# Patient Record
Sex: FEMALE | Race: BLACK OR AFRICAN AMERICAN | NOT HISPANIC OR LATINO | URBAN - METROPOLITAN AREA
[De-identification: names, ages, dates, MRNs, and addresses within clinical notes are randomized per-mention and may not be internally consistent; named-entity substitution may affect disease eponyms.]

---

## 2017-01-17 ENCOUNTER — OUTPATIENT (OUTPATIENT)
Dept: OUTPATIENT SERVICES | Facility: HOSPITAL | Age: 82
LOS: 1 days | Discharge: HOME | End: 2017-01-17

## 2017-01-17 ENCOUNTER — RESULT REVIEW (OUTPATIENT)
Age: 82
End: 2017-01-17

## 2017-01-17 DIAGNOSIS — C50.919 MALIGNANT NEOPLASM OF UNSPECIFIED SITE OF UNSPECIFIED FEMALE BREAST: ICD-10-CM

## 2017-01-18 ENCOUNTER — APPOINTMENT (OUTPATIENT)
Dept: HEMATOLOGY ONCOLOGY | Facility: CLINIC | Age: 82
End: 2017-01-18

## 2017-01-18 VITALS
RESPIRATION RATE: 16 BRPM | TEMPERATURE: 97.7 F | HEART RATE: 80 BPM | WEIGHT: 165.35 LBS | DIASTOLIC BLOOD PRESSURE: 80 MMHG | BODY MASS INDEX: 29.29 KG/M2 | OXYGEN SATURATION: 98 % | SYSTOLIC BLOOD PRESSURE: 146 MMHG

## 2017-01-18 LAB
HCT VFR BLD CALC: 41.7 % — SIGNIFICANT CHANGE UP (ref 34.5–45)
HGB BLD-MCNC: 13 G/DL — SIGNIFICANT CHANGE UP (ref 11.5–15.5)
MCHC RBC-ENTMCNC: 27.5 PG — SIGNIFICANT CHANGE UP (ref 27–34)
MCHC RBC-ENTMCNC: 31.1 GM/DL — LOW (ref 32–36)
MCV RBC AUTO: 88.6 FL — SIGNIFICANT CHANGE UP (ref 80–100)
PLATELET # BLD AUTO: 267 K/UL — SIGNIFICANT CHANGE UP (ref 150–400)
RBC # BLD: 4.71 M/UL — SIGNIFICANT CHANGE UP (ref 3.8–5.2)
RBC # FLD: 13.4 % — SIGNIFICANT CHANGE UP (ref 10.3–14.5)
WBC # BLD: 7.2 K/UL — SIGNIFICANT CHANGE UP (ref 3.8–10.5)
WBC # FLD AUTO: 7.2 K/UL — SIGNIFICANT CHANGE UP (ref 3.8–10.5)

## 2017-01-20 LAB
25(OH)D3 SERPL-MCNC: 42.3 NG/ML
ALBUMIN SERPL ELPH-MCNC: 4.2 G/DL
ALP BLD-CCNC: 77 U/L
ALT SERPL-CCNC: 12 U/L
ANION GAP SERPL CALC-SCNC: 16 MMOL/L
AST SERPL-CCNC: 19 U/L
BILIRUB SERPL-MCNC: 0.3 MG/DL
BUN SERPL-MCNC: 13 MG/DL
CALCIUM SERPL-MCNC: 10.2 MG/DL
CHLORIDE SERPL-SCNC: 103 MMOL/L
CO2 SERPL-SCNC: 26 MMOL/L
CREAT SERPL-MCNC: 1.28 MG/DL
GLUCOSE SERPL-MCNC: 143 MG/DL
POTASSIUM SERPL-SCNC: 4.3 MMOL/L
PROT SERPL-MCNC: 7.5 G/DL
SODIUM SERPL-SCNC: 145 MMOL/L

## 2017-03-14 ENCOUNTER — APPOINTMENT (OUTPATIENT)
Dept: CARDIOLOGY | Facility: CLINIC | Age: 82
End: 2017-03-14

## 2017-03-16 ENCOUNTER — RX RENEWAL (OUTPATIENT)
Age: 82
End: 2017-03-16

## 2017-05-04 ENCOUNTER — OUTPATIENT (OUTPATIENT)
Dept: OUTPATIENT SERVICES | Facility: HOSPITAL | Age: 82
LOS: 1 days | Discharge: HOME | End: 2017-05-04

## 2017-05-04 DIAGNOSIS — C50.919 MALIGNANT NEOPLASM OF UNSPECIFIED SITE OF UNSPECIFIED FEMALE BREAST: ICD-10-CM

## 2017-05-08 ENCOUNTER — APPOINTMENT (OUTPATIENT)
Dept: HEMATOLOGY ONCOLOGY | Facility: CLINIC | Age: 82
End: 2017-05-08

## 2017-05-08 ENCOUNTER — RESULT REVIEW (OUTPATIENT)
Age: 82
End: 2017-05-08

## 2017-05-08 VITALS
DIASTOLIC BLOOD PRESSURE: 70 MMHG | SYSTOLIC BLOOD PRESSURE: 148 MMHG | TEMPERATURE: 97.8 F | BODY MASS INDEX: 29.09 KG/M2 | RESPIRATION RATE: 17 BRPM | HEART RATE: 65 BPM | WEIGHT: 164.24 LBS | OXYGEN SATURATION: 97 %

## 2017-05-08 DIAGNOSIS — I89.0 LYMPHEDEMA, NOT ELSEWHERE CLASSIFIED: ICD-10-CM

## 2017-05-08 LAB
HCT VFR BLD CALC: 36.9 % — SIGNIFICANT CHANGE UP (ref 34.5–45)
HGB BLD-MCNC: 11.6 G/DL — SIGNIFICANT CHANGE UP (ref 11.5–15.5)
MCHC RBC-ENTMCNC: 28.1 PG — SIGNIFICANT CHANGE UP (ref 27–34)
MCHC RBC-ENTMCNC: 31.5 G/DL — LOW (ref 32–36)
MCV RBC AUTO: 89.3 FL — SIGNIFICANT CHANGE UP (ref 80–100)
PLATELET # BLD AUTO: 267 K/UL — SIGNIFICANT CHANGE UP (ref 150–400)
RBC # BLD: 4.13 M/UL — SIGNIFICANT CHANGE UP (ref 3.8–5.2)
RBC # FLD: 13.1 % — SIGNIFICANT CHANGE UP (ref 10.3–14.5)
WBC # BLD: 8 K/UL — SIGNIFICANT CHANGE UP (ref 3.8–10.5)
WBC # FLD AUTO: 8 K/UL — SIGNIFICANT CHANGE UP (ref 3.8–10.5)

## 2017-05-08 RX ORDER — CELECOXIB 100 MG/1
100 CAPSULE ORAL
Qty: 60 | Refills: 1 | Status: ACTIVE | COMMUNITY
Start: 2017-05-08 | End: 1900-01-01

## 2017-05-09 LAB
ALBUMIN SERPL ELPH-MCNC: 4.4 G/DL
ALP BLD-CCNC: 70 U/L
ALT SERPL-CCNC: 13 U/L
ANION GAP SERPL CALC-SCNC: 15 MMOL/L
AST SERPL-CCNC: 21 U/L
BILIRUB SERPL-MCNC: 0.2 MG/DL
BUN SERPL-MCNC: 23 MG/DL
CALCIUM SERPL-MCNC: 10.1 MG/DL
CANCER AG27-29 SERPL-ACNC: 12.6 U/ML
CEA SERPL-MCNC: 2.8 NG/ML
CHLORIDE SERPL-SCNC: 102 MMOL/L
CO2 SERPL-SCNC: 24 MMOL/L
CREAT SERPL-MCNC: 1.24 MG/DL
GLUCOSE SERPL-MCNC: 102 MG/DL
POTASSIUM SERPL-SCNC: 4.5 MMOL/L
PROT SERPL-MCNC: 7.3 G/DL
SODIUM SERPL-SCNC: 141 MMOL/L

## 2017-05-23 ENCOUNTER — APPOINTMENT (OUTPATIENT)
Dept: CARDIOLOGY | Facility: CLINIC | Age: 82
End: 2017-05-23

## 2017-05-23 VITALS
WEIGHT: 160 LBS | SYSTOLIC BLOOD PRESSURE: 140 MMHG | DIASTOLIC BLOOD PRESSURE: 70 MMHG | HEART RATE: 80 BPM | HEIGHT: 63 IN | BODY MASS INDEX: 28.35 KG/M2

## 2017-05-23 DIAGNOSIS — Z92.3 PERSONAL HISTORY OF IRRADIATION: ICD-10-CM

## 2017-05-23 DIAGNOSIS — Z92.21 PERSONAL HISTORY OF ANTINEOPLASTIC CHEMOTHERAPY: ICD-10-CM

## 2017-05-23 DIAGNOSIS — E11.9 TYPE 2 DIABETES MELLITUS W/OUT COMPLICATIONS: ICD-10-CM

## 2017-05-23 DIAGNOSIS — I10 ESSENTIAL (PRIMARY) HYPERTENSION: ICD-10-CM

## 2017-05-23 DIAGNOSIS — E78.5 HYPERLIPIDEMIA, UNSPECIFIED: ICD-10-CM

## 2017-06-01 ENCOUNTER — FORM ENCOUNTER (OUTPATIENT)
Age: 82
End: 2017-06-01

## 2017-06-02 ENCOUNTER — APPOINTMENT (OUTPATIENT)
Dept: ULTRASOUND IMAGING | Facility: IMAGING CENTER | Age: 82
End: 2017-06-02

## 2017-06-02 ENCOUNTER — OUTPATIENT (OUTPATIENT)
Dept: OUTPATIENT SERVICES | Facility: HOSPITAL | Age: 82
LOS: 1 days | End: 2017-06-02
Payer: MEDICARE

## 2017-06-02 DIAGNOSIS — Z00.8 ENCOUNTER FOR OTHER GENERAL EXAMINATION: ICD-10-CM

## 2017-06-02 PROCEDURE — G0279: CPT

## 2017-06-02 PROCEDURE — 76642 ULTRASOUND BREAST LIMITED: CPT

## 2017-06-02 PROCEDURE — 77065 DX MAMMO INCL CAD UNI: CPT

## 2017-06-08 ENCOUNTER — FORM ENCOUNTER (OUTPATIENT)
Age: 82
End: 2017-06-08

## 2017-06-09 ENCOUNTER — RESULT REVIEW (OUTPATIENT)
Age: 82
End: 2017-06-09

## 2017-06-09 ENCOUNTER — OUTPATIENT (OUTPATIENT)
Dept: OUTPATIENT SERVICES | Facility: HOSPITAL | Age: 82
LOS: 1 days | End: 2017-06-09
Payer: MEDICARE

## 2017-06-09 ENCOUNTER — APPOINTMENT (OUTPATIENT)
Dept: ULTRASOUND IMAGING | Facility: IMAGING CENTER | Age: 82
End: 2017-06-09

## 2017-06-09 DIAGNOSIS — Z00.8 ENCOUNTER FOR OTHER GENERAL EXAMINATION: ICD-10-CM

## 2017-06-09 PROCEDURE — 88305 TISSUE EXAM BY PATHOLOGIST: CPT

## 2017-06-09 PROCEDURE — 88377 M/PHMTRC ALYS ISHQUANT/SEMIQ: CPT

## 2017-06-09 PROCEDURE — 88360 TUMOR IMMUNOHISTOCHEM/MANUAL: CPT

## 2017-06-09 PROCEDURE — A4648: CPT

## 2017-06-09 PROCEDURE — 19083 BX BREAST 1ST LESION US IMAG: CPT

## 2017-06-12 ENCOUNTER — FORM ENCOUNTER (OUTPATIENT)
Age: 82
End: 2017-06-12

## 2017-06-13 ENCOUNTER — APPOINTMENT (OUTPATIENT)
Dept: CT IMAGING | Facility: IMAGING CENTER | Age: 82
End: 2017-06-13

## 2017-06-13 ENCOUNTER — APPOINTMENT (OUTPATIENT)
Dept: NUCLEAR MEDICINE | Facility: IMAGING CENTER | Age: 82
End: 2017-06-13

## 2017-06-13 ENCOUNTER — OUTPATIENT (OUTPATIENT)
Dept: OUTPATIENT SERVICES | Facility: HOSPITAL | Age: 82
LOS: 1 days | End: 2017-06-13
Payer: MEDICARE

## 2017-06-13 DIAGNOSIS — C50.919 MALIGNANT NEOPLASM OF UNSPECIFIED SITE OF UNSPECIFIED FEMALE BREAST: ICD-10-CM

## 2017-06-13 PROCEDURE — 74177 CT ABD & PELVIS W/CONTRAST: CPT | Mod: 26

## 2017-06-13 PROCEDURE — 78306 BONE IMAGING WHOLE BODY: CPT | Mod: 26

## 2017-06-13 PROCEDURE — 71260 CT THORAX DX C+: CPT

## 2017-06-13 PROCEDURE — 71260 CT THORAX DX C+: CPT | Mod: 26

## 2017-06-13 PROCEDURE — A9561: CPT

## 2017-06-13 PROCEDURE — 74177 CT ABD & PELVIS W/CONTRAST: CPT

## 2017-06-13 PROCEDURE — 78306 BONE IMAGING WHOLE BODY: CPT

## 2017-06-13 PROCEDURE — 78320: CPT | Mod: 26

## 2017-06-13 PROCEDURE — 78999 UNLISTED MISC PX DX NUC MED: CPT

## 2017-06-22 ENCOUNTER — FORM ENCOUNTER (OUTPATIENT)
Age: 82
End: 2017-06-22

## 2017-06-23 ENCOUNTER — APPOINTMENT (OUTPATIENT)
Dept: MRI IMAGING | Facility: IMAGING CENTER | Age: 82
End: 2017-06-23

## 2017-06-23 ENCOUNTER — OUTPATIENT (OUTPATIENT)
Dept: OUTPATIENT SERVICES | Facility: HOSPITAL | Age: 82
LOS: 1 days | End: 2017-06-23
Payer: MEDICARE

## 2017-06-23 DIAGNOSIS — C50.919 MALIGNANT NEOPLASM OF UNSPECIFIED SITE OF UNSPECIFIED FEMALE BREAST: ICD-10-CM

## 2017-06-23 PROCEDURE — 74183 MRI ABD W/O CNTR FLWD CNTR: CPT

## 2017-06-23 PROCEDURE — 82565 ASSAY OF CREATININE: CPT

## 2017-06-23 PROCEDURE — A9585: CPT

## 2017-06-29 ENCOUNTER — OUTPATIENT (OUTPATIENT)
Dept: OUTPATIENT SERVICES | Facility: HOSPITAL | Age: 82
LOS: 1 days | Discharge: HOME | End: 2017-06-29

## 2017-06-29 DIAGNOSIS — C50.919 MALIGNANT NEOPLASM OF UNSPECIFIED SITE OF UNSPECIFIED FEMALE BREAST: ICD-10-CM

## 2017-07-06 ENCOUNTER — APPOINTMENT (OUTPATIENT)
Dept: HEMATOLOGY ONCOLOGY | Facility: CLINIC | Age: 82
End: 2017-07-06

## 2017-07-06 VITALS
DIASTOLIC BLOOD PRESSURE: 69 MMHG | OXYGEN SATURATION: 98 % | HEART RATE: 67 BPM | SYSTOLIC BLOOD PRESSURE: 168 MMHG | RESPIRATION RATE: 18 BRPM | WEIGHT: 163.36 LBS | TEMPERATURE: 98.4 F | BODY MASS INDEX: 28.94 KG/M2

## 2017-07-12 ENCOUNTER — FORM ENCOUNTER (OUTPATIENT)
Age: 82
End: 2017-07-12

## 2017-07-18 ENCOUNTER — APPOINTMENT (OUTPATIENT)
Dept: CV DIAGNOSITCS | Facility: HOSPITAL | Age: 82
End: 2017-07-18

## 2017-07-18 ENCOUNTER — OUTPATIENT (OUTPATIENT)
Dept: OUTPATIENT SERVICES | Facility: HOSPITAL | Age: 82
LOS: 1 days | End: 2017-07-18
Payer: MEDICARE

## 2017-07-18 DIAGNOSIS — C50.919 MALIGNANT NEOPLASM OF UNSPECIFIED SITE OF UNSPECIFIED FEMALE BREAST: ICD-10-CM

## 2017-07-18 PROCEDURE — 0399T: CPT

## 2017-07-18 PROCEDURE — 93356 MYOCRD STRAIN IMG SPCKL TRCK: CPT

## 2017-07-18 PROCEDURE — 93306 TTE W/DOPPLER COMPLETE: CPT | Mod: 26

## 2017-07-18 PROCEDURE — 93306 TTE W/DOPPLER COMPLETE: CPT

## 2017-08-17 ENCOUNTER — OUTPATIENT (OUTPATIENT)
Dept: OUTPATIENT SERVICES | Facility: HOSPITAL | Age: 82
LOS: 1 days | Discharge: HOME | End: 2017-08-17

## 2017-08-17 DIAGNOSIS — C50.919 MALIGNANT NEOPLASM OF UNSPECIFIED SITE OF UNSPECIFIED FEMALE BREAST: ICD-10-CM

## 2017-08-18 ENCOUNTER — APPOINTMENT (OUTPATIENT)
Dept: HEMATOLOGY ONCOLOGY | Facility: CLINIC | Age: 82
End: 2017-08-18
Payer: MEDICARE

## 2017-08-18 ENCOUNTER — RESULT REVIEW (OUTPATIENT)
Age: 82
End: 2017-08-18

## 2017-08-18 VITALS
RESPIRATION RATE: 18 BRPM | WEIGHT: 167.55 LBS | BODY MASS INDEX: 29.68 KG/M2 | SYSTOLIC BLOOD PRESSURE: 185 MMHG | DIASTOLIC BLOOD PRESSURE: 79 MMHG | OXYGEN SATURATION: 99 % | TEMPERATURE: 98.3 F | HEART RATE: 57 BPM

## 2017-08-18 LAB
HCT VFR BLD CALC: 38.7 % — SIGNIFICANT CHANGE UP (ref 34.5–45)
HGB BLD-MCNC: 12.4 G/DL — SIGNIFICANT CHANGE UP (ref 11.5–15.5)
MCHC RBC-ENTMCNC: 28.4 PG — SIGNIFICANT CHANGE UP (ref 27–34)
MCHC RBC-ENTMCNC: 32 G/DL — SIGNIFICANT CHANGE UP (ref 32–36)
MCV RBC AUTO: 88.6 FL — SIGNIFICANT CHANGE UP (ref 80–100)
PLATELET # BLD AUTO: 254 K/UL — SIGNIFICANT CHANGE UP (ref 150–400)
RBC # BLD: 4.37 M/UL — SIGNIFICANT CHANGE UP (ref 3.8–5.2)
RBC # FLD: 13.7 % — SIGNIFICANT CHANGE UP (ref 10.3–14.5)
WBC # BLD: 5.6 K/UL — SIGNIFICANT CHANGE UP (ref 3.8–10.5)
WBC # FLD AUTO: 5.6 K/UL — SIGNIFICANT CHANGE UP (ref 3.8–10.5)

## 2017-08-18 PROCEDURE — 99214 OFFICE O/P EST MOD 30 MIN: CPT

## 2017-08-20 LAB
ALBUMIN SERPL ELPH-MCNC: 4.1 G/DL
ALP BLD-CCNC: 76 U/L
ALT SERPL-CCNC: 13 U/L
ANION GAP SERPL CALC-SCNC: 17 MMOL/L
APTT BLD: 32.8 SEC
AST SERPL-CCNC: 23 U/L
BILIRUB SERPL-MCNC: 0.2 MG/DL
BUN SERPL-MCNC: 13 MG/DL
CALCIUM SERPL-MCNC: 10.2 MG/DL
CANCER AG27-29 SERPL-ACNC: 15.9 U/ML
CEA SERPL-MCNC: 7.8 NG/ML
CHLORIDE SERPL-SCNC: 100 MMOL/L
CHOLEST SERPL-MCNC: 221 MG/DL
CHOLEST/HDLC SERPL: 2.5 RATIO
CO2 SERPL-SCNC: 25 MMOL/L
CREAT SERPL-MCNC: 1.14 MG/DL
GLUCOSE SERPL-MCNC: 109 MG/DL
HBA1C MFR BLD HPLC: 6.7 %
HDLC SERPL-MCNC: 90 MG/DL
INR PPP: 1 RATIO
LDLC SERPL CALC-MCNC: 119 MG/DL
MAGNESIUM SERPL-MCNC: 1.8 MG/DL
POTASSIUM SERPL-SCNC: 4.4 MMOL/L
PROT SERPL-MCNC: 7.1 G/DL
PT BLD: 11.3 SEC
SODIUM SERPL-SCNC: 142 MMOL/L
TRIGL SERPL-MCNC: 62 MG/DL

## 2017-08-30 ENCOUNTER — OUTPATIENT (OUTPATIENT)
Dept: OUTPATIENT SERVICES | Facility: HOSPITAL | Age: 82
LOS: 1 days | End: 2017-08-30
Payer: MEDICARE

## 2017-08-30 VITALS
HEIGHT: 64 IN | WEIGHT: 169.98 LBS | DIASTOLIC BLOOD PRESSURE: 80 MMHG | OXYGEN SATURATION: 97 % | HEART RATE: 84 BPM | SYSTOLIC BLOOD PRESSURE: 148 MMHG | TEMPERATURE: 99 F | RESPIRATION RATE: 16 BRPM

## 2017-08-30 DIAGNOSIS — Z90.12 ACQUIRED ABSENCE OF LEFT BREAST AND NIPPLE: Chronic | ICD-10-CM

## 2017-08-30 DIAGNOSIS — R22.2 LOCALIZED SWELLING, MASS AND LUMP, TRUNK: ICD-10-CM

## 2017-08-30 DIAGNOSIS — C50.919 MALIGNANT NEOPLASM OF UNSPECIFIED SITE OF UNSPECIFIED FEMALE BREAST: ICD-10-CM

## 2017-08-30 DIAGNOSIS — I10 ESSENTIAL (PRIMARY) HYPERTENSION: ICD-10-CM

## 2017-08-30 DIAGNOSIS — Z01.818 ENCOUNTER FOR OTHER PREPROCEDURAL EXAMINATION: ICD-10-CM

## 2017-08-30 PROCEDURE — G0463: CPT

## 2017-08-30 RX ORDER — SODIUM CHLORIDE 9 MG/ML
3 INJECTION INTRAMUSCULAR; INTRAVENOUS; SUBCUTANEOUS EVERY 8 HOURS
Qty: 0 | Refills: 0 | Status: DISCONTINUED | OUTPATIENT
Start: 2017-09-06 | End: 2017-09-21

## 2017-08-30 RX ORDER — ACETAMINOPHEN 500 MG
975 TABLET ORAL ONCE
Qty: 0 | Refills: 0 | Status: COMPLETED | OUTPATIENT
Start: 2017-09-06 | End: 2017-09-06

## 2017-08-30 RX ORDER — CELECOXIB 200 MG/1
200 CAPSULE ORAL ONCE
Qty: 0 | Refills: 0 | Status: COMPLETED | OUTPATIENT
Start: 2017-09-06 | End: 2017-09-06

## 2017-08-30 RX ORDER — LIDOCAINE HCL 20 MG/ML
0.2 VIAL (ML) INJECTION ONCE
Qty: 0 | Refills: 0 | Status: DISCONTINUED | OUTPATIENT
Start: 2017-09-06 | End: 2017-09-21

## 2017-08-30 NOTE — H&P PST ADULT - PRIMARY CARE PROVIDER
Dr.Michelle Luna # [phone]                  Dr. Chery  ([facility])# [phone]

## 2017-08-30 NOTE — H&P PST ADULT - HISTORY OF PRESENT ILLNESS
82 year old female diagnosed with breast cancer (2015), had chemotherapy s/p left modified radical mastectomy. Pt had f/u evaluation revealed left chest mass- s/p MRI chest - scheduled for excision of left chest mass on 09/06/2017

## 2017-08-30 NOTE — H&P PST ADULT - PMH
Arthritis of spine    Breast cancer, left    Chest mass    Diabetes  diabetes mellitus type II  fingerstick range in the mornig 110-120 mg/ dl  H/O lymphadenopathy  left arm  H/O neuropathy  Since chemotherapy  HTN (hypertension)    Hyperlipidemia

## 2017-09-06 ENCOUNTER — TRANSCRIPTION ENCOUNTER (OUTPATIENT)
Age: 82
End: 2017-09-06

## 2017-09-06 ENCOUNTER — RESULT REVIEW (OUTPATIENT)
Age: 82
End: 2017-09-06

## 2017-09-06 ENCOUNTER — OUTPATIENT (OUTPATIENT)
Dept: OUTPATIENT SERVICES | Facility: HOSPITAL | Age: 82
LOS: 1 days | End: 2017-09-06
Payer: MEDICARE

## 2017-09-06 VITALS
HEIGHT: 64 IN | TEMPERATURE: 98 F | RESPIRATION RATE: 20 BRPM | WEIGHT: 169.98 LBS | SYSTOLIC BLOOD PRESSURE: 167 MMHG | OXYGEN SATURATION: 99 % | DIASTOLIC BLOOD PRESSURE: 78 MMHG | HEART RATE: 76 BPM

## 2017-09-06 VITALS
DIASTOLIC BLOOD PRESSURE: 80 MMHG | HEART RATE: 76 BPM | OXYGEN SATURATION: 99 % | TEMPERATURE: 99 F | RESPIRATION RATE: 16 BRPM | SYSTOLIC BLOOD PRESSURE: 140 MMHG

## 2017-09-06 DIAGNOSIS — C50.919 MALIGNANT NEOPLASM OF UNSPECIFIED SITE OF UNSPECIFIED FEMALE BREAST: ICD-10-CM

## 2017-09-06 DIAGNOSIS — Z90.12 ACQUIRED ABSENCE OF LEFT BREAST AND NIPPLE: Chronic | ICD-10-CM

## 2017-09-06 PROCEDURE — 21554 EXC NECK TUM DEEP 5 CM/>: CPT

## 2017-09-06 PROCEDURE — 88305 TISSUE EXAM BY PATHOLOGIST: CPT | Mod: 26

## 2017-09-06 PROCEDURE — 88377 M/PHMTRC ALYS ISHQUANT/SEMIQ: CPT | Mod: 26

## 2017-09-06 PROCEDURE — 88360 TUMOR IMMUNOHISTOCHEM/MANUAL: CPT | Mod: 26

## 2017-09-06 PROCEDURE — 88305 TISSUE EXAM BY PATHOLOGIST: CPT

## 2017-09-06 PROCEDURE — 88341 IMHCHEM/IMCYTCHM EA ADD ANTB: CPT

## 2017-09-06 PROCEDURE — 14301 TIS TRNFR ANY 30.1-60 SQ CM: CPT

## 2017-09-06 PROCEDURE — 14302 TIS TRNFR ADDL 30 SQ CM: CPT

## 2017-09-06 PROCEDURE — 88342 IMHCHEM/IMCYTCHM 1ST ANTB: CPT | Mod: 26,59

## 2017-09-06 PROCEDURE — 88377 M/PHMTRC ALYS ISHQUANT/SEMIQ: CPT

## 2017-09-06 PROCEDURE — 88360 TUMOR IMMUNOHISTOCHEM/MANUAL: CPT

## 2017-09-06 RX ORDER — HYDROMORPHONE HYDROCHLORIDE 2 MG/ML
0.25 INJECTION INTRAMUSCULAR; INTRAVENOUS; SUBCUTANEOUS
Qty: 0 | Refills: 0 | Status: DISCONTINUED | OUTPATIENT
Start: 2017-09-06 | End: 2017-09-06

## 2017-09-06 RX ORDER — ACETAMINOPHEN WITH CODEINE 300MG-30MG
1 TABLET ORAL
Qty: 20 | Refills: 0 | OUTPATIENT
Start: 2017-09-06

## 2017-09-06 RX ADMIN — HYDROMORPHONE HYDROCHLORIDE 0.25 MILLIGRAM(S): 2 INJECTION INTRAMUSCULAR; INTRAVENOUS; SUBCUTANEOUS at 15:10

## 2017-09-06 RX ADMIN — HYDROMORPHONE HYDROCHLORIDE 0.25 MILLIGRAM(S): 2 INJECTION INTRAMUSCULAR; INTRAVENOUS; SUBCUTANEOUS at 15:02

## 2017-09-06 RX ADMIN — Medication 975 MILLIGRAM(S): at 11:10

## 2017-09-06 RX ADMIN — CELECOXIB 200 MILLIGRAM(S): 200 CAPSULE ORAL at 11:10

## 2017-09-06 NOTE — ASU DISCHARGE PLAN (ADULT/PEDIATRIC). - MEDICATION SUMMARY - MEDICATIONS TO TAKE
I will START or STAY ON the medications listed below when I get home from the hospital:    Tylenol with Codeine #3 oral tablet  -- 1 tab(s) by mouth every 6 hours, As Needed for moderate to severe pain MDD:4 tabs  -- Caution federal law prohibits the transfer of this drug to any person other  than the person for whom it was prescribed.  May cause drowsiness.  Alcohol may intensify this effect.  Use care when operating dangerous machinery.  This product contains acetaminophen.  Do not use  with any other product containing acetaminophen to prevent possible liver damage.  Using more of this medication than prescribed may cause serious breathing problems.    -- Indication: For Postoperative pain control as needed    CeleBREX 100 mg oral capsule  --  by mouth 2 times a day  -- Indication: For Home medication    quinapril 40 mg oral tablet  -- 1 tab(s) by mouth once a day  -- Indication: For Home medication    gabapentin 300 mg oral capsule  -- 1 cap(s) by mouth 2 times a day  -- Indication: For Home medication    metFORMIN 1000 mg oral tablet  -- 1 tab(s) by mouth 2 times a day  -- Indication: For Home medication    labetalol 100 mg oral tablet  --  by mouth once a day  -- Indication: For Home medication    amLODIPine 10 mg oral tablet  -- 1 tab(s) by mouth once a day  -- Indication: For Home medication    famotidine 20 mg oral tablet  --  by mouth x 2 doses pre op x 2 doses as directed  -- Indication: For Home medication    Augmentin 875 mg-125 mg oral tablet  -- 1 tab(s) by mouth every 12 hours  -- Finish all this medication unless otherwise directed by prescriber.  Take with food or milk.    -- Indication: For Postoperative antibiotics    Vitamin D3  -- 100 milligram(s) by mouth once a day  -- Indication: For Home medication    Vitamin B12 100 mcg oral tablet  -- 1 tab(s) by mouth once a day  -- Indication: For Home medication

## 2017-09-06 NOTE — PRE-ANESTHESIA EVALUATION ADULT - NSANTHOSAYNRD_GEN_A_CORE
No. LUIS ALBERTO screening performed.  STOP BANG Legend: 0-2 = LOW Risk; 3-4 = INTERMEDIATE Risk; 5-8 = HIGH Risk

## 2017-09-06 NOTE — ASU DISCHARGE PLAN (ADULT/PEDIATRIC). - NOTIFY
Fever greater than 101/Swelling that continues/Bleeding that does not stop/Persistent Nausea and Vomiting/Unable to Urinate/Inability to Tolerate Liquids or Foods/Pain not relieved by Medications

## 2017-09-11 LAB — SURGICAL PATHOLOGY STUDY: SIGNIFICANT CHANGE UP

## 2017-10-24 ENCOUNTER — OUTPATIENT (OUTPATIENT)
Dept: OUTPATIENT SERVICES | Facility: HOSPITAL | Age: 82
LOS: 1 days | Discharge: HOME | End: 2017-10-24

## 2017-10-24 DIAGNOSIS — Z90.12 ACQUIRED ABSENCE OF LEFT BREAST AND NIPPLE: Chronic | ICD-10-CM

## 2017-10-24 DIAGNOSIS — C50.919 MALIGNANT NEOPLASM OF UNSPECIFIED SITE OF UNSPECIFIED FEMALE BREAST: ICD-10-CM

## 2017-10-30 ENCOUNTER — APPOINTMENT (OUTPATIENT)
Dept: HEMATOLOGY ONCOLOGY | Facility: CLINIC | Age: 82
End: 2017-10-30
Payer: MEDICARE

## 2017-10-30 ENCOUNTER — LABORATORY RESULT (OUTPATIENT)
Age: 82
End: 2017-10-30

## 2017-10-30 ENCOUNTER — RESULT REVIEW (OUTPATIENT)
Age: 82
End: 2017-10-30

## 2017-10-30 VITALS
OXYGEN SATURATION: 99 % | DIASTOLIC BLOOD PRESSURE: 72 MMHG | RESPIRATION RATE: 16 BRPM | HEART RATE: 71 BPM | BODY MASS INDEX: 29.48 KG/M2 | WEIGHT: 166.45 LBS | SYSTOLIC BLOOD PRESSURE: 153 MMHG | TEMPERATURE: 98.1 F

## 2017-10-30 LAB
ALBUMIN SERPL ELPH-MCNC: 3.9 G/DL
ALP BLD-CCNC: 78 U/L
ALT SERPL-CCNC: 15 U/L
ANION GAP SERPL CALC-SCNC: 13 MMOL/L
AST SERPL-CCNC: 34 U/L
BILIRUB SERPL-MCNC: 0.3 MG/DL
BUN SERPL-MCNC: 17 MG/DL
CALCIUM SERPL-MCNC: 10.2 MG/DL
CHLORIDE SERPL-SCNC: 102 MMOL/L
CO2 SERPL-SCNC: 26 MMOL/L
CREAT SERPL-MCNC: 1.3 MG/DL
GLUCOSE SERPL-MCNC: 112 MG/DL
HCT VFR BLD CALC: 37.3 % — SIGNIFICANT CHANGE UP (ref 34.5–45)
HGB BLD-MCNC: 12.5 G/DL — SIGNIFICANT CHANGE UP (ref 11.5–15.5)
MCHC RBC-ENTMCNC: 29.3 PG — SIGNIFICANT CHANGE UP (ref 27–34)
MCHC RBC-ENTMCNC: 33.4 G/DL — SIGNIFICANT CHANGE UP (ref 32–36)
MCV RBC AUTO: 87.7 FL — SIGNIFICANT CHANGE UP (ref 80–100)
PLATELET # BLD AUTO: 297 K/UL — SIGNIFICANT CHANGE UP (ref 150–400)
POTASSIUM SERPL-SCNC: 4.5 MMOL/L
PROT SERPL-MCNC: 7.1 G/DL
RBC # BLD: 4.26 M/UL — SIGNIFICANT CHANGE UP (ref 3.8–5.2)
RBC # FLD: 13.4 % — SIGNIFICANT CHANGE UP (ref 10.3–14.5)
SODIUM SERPL-SCNC: 141 MMOL/L
WBC # BLD: 5.2 K/UL — SIGNIFICANT CHANGE UP (ref 3.8–10.5)
WBC # FLD AUTO: 5.2 K/UL — SIGNIFICANT CHANGE UP (ref 3.8–10.5)

## 2017-10-30 PROCEDURE — 99215 OFFICE O/P EST HI 40 MIN: CPT

## 2017-10-30 RX ORDER — UBIDECARENONE/VIT E ACET 100MG-5
50 MCG CAPSULE ORAL
Refills: 0 | Status: ACTIVE | COMMUNITY
Start: 2017-10-30

## 2017-10-30 RX ORDER — MAGNESIUM 200 MG
200 TABLET ORAL
Refills: 0 | Status: ACTIVE | COMMUNITY
Start: 2017-10-30

## 2017-10-30 RX ORDER — ASCORBIC ACID 1000 MG
10 TABLET ORAL
Refills: 0 | Status: ACTIVE | COMMUNITY
Start: 2017-10-30

## 2017-11-06 ENCOUNTER — FORM ENCOUNTER (OUTPATIENT)
Age: 82
End: 2017-11-06

## 2017-11-07 ENCOUNTER — APPOINTMENT (OUTPATIENT)
Dept: CT IMAGING | Facility: IMAGING CENTER | Age: 82
End: 2017-11-07
Payer: MEDICARE

## 2017-11-07 ENCOUNTER — OUTPATIENT (OUTPATIENT)
Dept: OUTPATIENT SERVICES | Facility: HOSPITAL | Age: 82
LOS: 1 days | End: 2017-11-07
Payer: MEDICARE

## 2017-11-07 DIAGNOSIS — R22.32 LOCALIZED SWELLING, MASS AND LUMP, LEFT UPPER LIMB: ICD-10-CM

## 2017-11-07 DIAGNOSIS — Z90.12 ACQUIRED ABSENCE OF LEFT BREAST AND NIPPLE: Chronic | ICD-10-CM

## 2017-11-07 PROCEDURE — 71260 CT THORAX DX C+: CPT

## 2017-11-07 PROCEDURE — 71260 CT THORAX DX C+: CPT | Mod: 26

## 2017-11-08 RX ORDER — ENZALUTAMIDE 40 MG/1
40 CAPSULE ORAL DAILY
Qty: 120 | Refills: 2 | Status: DISCONTINUED | COMMUNITY
Start: 2017-11-07 | End: 2017-11-08

## 2017-11-08 RX ORDER — CAPECITABINE 500 MG/1
500 TABLET ORAL TWICE DAILY
Qty: 84 | Refills: 3 | Status: ACTIVE | COMMUNITY
Start: 2017-11-08 | End: 1900-01-01

## 2017-11-17 ENCOUNTER — OUTPATIENT (OUTPATIENT)
Dept: OUTPATIENT SERVICES | Facility: HOSPITAL | Age: 82
LOS: 1 days | Discharge: HOME | End: 2017-11-17

## 2017-11-17 DIAGNOSIS — C50.919 MALIGNANT NEOPLASM OF UNSPECIFIED SITE OF UNSPECIFIED FEMALE BREAST: ICD-10-CM

## 2017-11-17 DIAGNOSIS — Z90.12 ACQUIRED ABSENCE OF LEFT BREAST AND NIPPLE: Chronic | ICD-10-CM

## 2017-11-24 ENCOUNTER — FORM ENCOUNTER (OUTPATIENT)
Age: 82
End: 2017-11-24

## 2017-11-25 ENCOUNTER — APPOINTMENT (OUTPATIENT)
Dept: NUCLEAR MEDICINE | Facility: IMAGING CENTER | Age: 82
End: 2017-11-25
Payer: MEDICARE

## 2017-11-25 ENCOUNTER — OUTPATIENT (OUTPATIENT)
Dept: OUTPATIENT SERVICES | Facility: HOSPITAL | Age: 82
LOS: 1 days | End: 2017-11-25
Payer: MEDICARE

## 2017-11-25 DIAGNOSIS — C50.919 MALIGNANT NEOPLASM OF UNSPECIFIED SITE OF UNSPECIFIED FEMALE BREAST: ICD-10-CM

## 2017-11-25 DIAGNOSIS — Z90.12 ACQUIRED ABSENCE OF LEFT BREAST AND NIPPLE: Chronic | ICD-10-CM

## 2017-11-25 PROCEDURE — 78815 PET IMAGE W/CT SKULL-THIGH: CPT

## 2017-11-25 PROCEDURE — 78815 PET IMAGE W/CT SKULL-THIGH: CPT | Mod: 26,PS

## 2017-11-25 PROCEDURE — A9552: CPT

## 2017-11-27 ENCOUNTER — RESULT REVIEW (OUTPATIENT)
Age: 82
End: 2017-11-27

## 2017-11-27 ENCOUNTER — APPOINTMENT (OUTPATIENT)
Dept: HEMATOLOGY ONCOLOGY | Facility: CLINIC | Age: 82
End: 2017-11-27
Payer: MEDICARE

## 2017-11-27 VITALS
RESPIRATION RATE: 16 BRPM | TEMPERATURE: 97.6 F | DIASTOLIC BLOOD PRESSURE: 83 MMHG | HEART RATE: 81 BPM | OXYGEN SATURATION: 100 % | BODY MASS INDEX: 28.7 KG/M2 | SYSTOLIC BLOOD PRESSURE: 182 MMHG | WEIGHT: 162.04 LBS

## 2017-11-27 DIAGNOSIS — R59.0 LOCALIZED ENLARGED LYMPH NODES: ICD-10-CM

## 2017-11-27 LAB
HCT VFR BLD CALC: 37.3 % — SIGNIFICANT CHANGE UP (ref 34.5–45)
HGB BLD-MCNC: 12 G/DL — SIGNIFICANT CHANGE UP (ref 11.5–15.5)
MCHC RBC-ENTMCNC: 28.1 PG — SIGNIFICANT CHANGE UP (ref 27–34)
MCHC RBC-ENTMCNC: 32.1 G/DL — SIGNIFICANT CHANGE UP (ref 32–36)
MCV RBC AUTO: 87.6 FL — SIGNIFICANT CHANGE UP (ref 80–100)
PLATELET # BLD AUTO: 340 K/UL — SIGNIFICANT CHANGE UP (ref 150–400)
RBC # BLD: 4.25 M/UL — SIGNIFICANT CHANGE UP (ref 3.8–5.2)
RBC # FLD: 14.2 % — SIGNIFICANT CHANGE UP (ref 10.3–14.5)
WBC # BLD: 6.7 K/UL — SIGNIFICANT CHANGE UP (ref 3.8–10.5)
WBC # FLD AUTO: 6.7 K/UL — SIGNIFICANT CHANGE UP (ref 3.8–10.5)

## 2017-11-27 PROCEDURE — 99215 OFFICE O/P EST HI 40 MIN: CPT

## 2017-11-28 LAB
CANCER AG27-29 SERPL-ACNC: 54.1 U/ML
CEA SERPL-MCNC: 49.8 NG/ML
MAGNESIUM SERPL-MCNC: 1.8 MG/DL

## 2017-12-14 LAB
ALBUMIN SERPL ELPH-MCNC: 4.2 G/DL
ALP BLD-CCNC: 81 U/L
ALT SERPL-CCNC: 16 U/L
ANION GAP SERPL CALC-SCNC: 13 MMOL/L
AST SERPL-CCNC: 31 U/L
BILIRUB SERPL-MCNC: 0.3 MG/DL
BUN SERPL-MCNC: 18 MG/DL
CALCIUM SERPL-MCNC: 10.2 MG/DL
CHLORIDE SERPL-SCNC: 97 MMOL/L
CO2 SERPL-SCNC: 29 MMOL/L
CREAT SERPL-MCNC: 1.29 MG/DL
GLUCOSE SERPL-MCNC: 86 MG/DL
POTASSIUM SERPL-SCNC: 4.1 MMOL/L
PROT SERPL-MCNC: 7.6 G/DL
SODIUM SERPL-SCNC: 139 MMOL/L

## 2017-12-15 ENCOUNTER — OUTPATIENT (OUTPATIENT)
Dept: OUTPATIENT SERVICES | Facility: HOSPITAL | Age: 82
LOS: 1 days | Discharge: HOME | End: 2017-12-15

## 2017-12-15 DIAGNOSIS — C50.919 MALIGNANT NEOPLASM OF UNSPECIFIED SITE OF UNSPECIFIED FEMALE BREAST: ICD-10-CM

## 2017-12-15 DIAGNOSIS — Z90.12 ACQUIRED ABSENCE OF LEFT BREAST AND NIPPLE: Chronic | ICD-10-CM

## 2017-12-22 ENCOUNTER — RESULT REVIEW (OUTPATIENT)
Age: 82
End: 2017-12-22

## 2017-12-22 ENCOUNTER — APPOINTMENT (OUTPATIENT)
Dept: HEMATOLOGY ONCOLOGY | Facility: CLINIC | Age: 82
End: 2017-12-22
Payer: MEDICARE

## 2017-12-22 VITALS
SYSTOLIC BLOOD PRESSURE: 150 MMHG | TEMPERATURE: 97.5 F | RESPIRATION RATE: 16 BRPM | WEIGHT: 158.73 LBS | BODY MASS INDEX: 28.12 KG/M2 | HEART RATE: 82 BPM | OXYGEN SATURATION: 99 % | DIASTOLIC BLOOD PRESSURE: 80 MMHG

## 2017-12-22 LAB
ALBUMIN SERPL ELPH-MCNC: 4.4 G/DL
ALP BLD-CCNC: 73 U/L
ALT SERPL-CCNC: 18 U/L
ANION GAP SERPL CALC-SCNC: 16 MMOL/L
AST SERPL-CCNC: 29 U/L
BILIRUB SERPL-MCNC: 0.4 MG/DL
BUN SERPL-MCNC: 19 MG/DL
CALCIUM SERPL-MCNC: 10.7 MG/DL
CANCER AG27-29 SERPL-ACNC: 42.7 U/ML
CEA SERPL-MCNC: 22.6 NG/ML
CHLORIDE SERPL-SCNC: 102 MMOL/L
CO2 SERPL-SCNC: 27 MMOL/L
CREAT SERPL-MCNC: 1.26 MG/DL
GLUCOSE SERPL-MCNC: 106 MG/DL
HCT VFR BLD CALC: 36.2 % — SIGNIFICANT CHANGE UP (ref 34.5–45)
HGB BLD-MCNC: 11.7 G/DL — SIGNIFICANT CHANGE UP (ref 11.5–15.5)
MCHC RBC-ENTMCNC: 28.7 PG — SIGNIFICANT CHANGE UP (ref 27–34)
MCHC RBC-ENTMCNC: 32.3 G/DL — SIGNIFICANT CHANGE UP (ref 32–36)
MCV RBC AUTO: 89.1 FL — SIGNIFICANT CHANGE UP (ref 80–100)
PLATELET # BLD AUTO: 210 K/UL — SIGNIFICANT CHANGE UP (ref 150–400)
POTASSIUM SERPL-SCNC: 4.4 MMOL/L
PROT SERPL-MCNC: 7.9 G/DL
RBC # BLD: 4.07 M/UL — SIGNIFICANT CHANGE UP (ref 3.8–5.2)
RBC # FLD: 16.2 % — HIGH (ref 10.3–14.5)
SODIUM SERPL-SCNC: 145 MMOL/L
WBC # BLD: 5.8 K/UL — SIGNIFICANT CHANGE UP (ref 3.8–10.5)
WBC # FLD AUTO: 5.8 K/UL — SIGNIFICANT CHANGE UP (ref 3.8–10.5)

## 2017-12-22 PROCEDURE — 99215 OFFICE O/P EST HI 40 MIN: CPT

## 2018-01-22 ENCOUNTER — OUTPATIENT (OUTPATIENT)
Dept: OUTPATIENT SERVICES | Facility: HOSPITAL | Age: 83
LOS: 1 days | Discharge: HOME | End: 2018-01-22

## 2018-01-22 DIAGNOSIS — Z90.12 ACQUIRED ABSENCE OF LEFT BREAST AND NIPPLE: Chronic | ICD-10-CM

## 2018-01-22 DIAGNOSIS — C50.919 MALIGNANT NEOPLASM OF UNSPECIFIED SITE OF UNSPECIFIED FEMALE BREAST: ICD-10-CM

## 2018-01-24 ENCOUNTER — RESULT REVIEW (OUTPATIENT)
Age: 83
End: 2018-01-24

## 2018-01-24 ENCOUNTER — APPOINTMENT (OUTPATIENT)
Dept: HEMATOLOGY ONCOLOGY | Facility: CLINIC | Age: 83
End: 2018-01-24
Payer: MEDICARE

## 2018-01-24 VITALS
HEART RATE: 86 BPM | SYSTOLIC BLOOD PRESSURE: 172 MMHG | DIASTOLIC BLOOD PRESSURE: 69 MMHG | OXYGEN SATURATION: 98 % | WEIGHT: 163.14 LBS | RESPIRATION RATE: 16 BRPM | TEMPERATURE: 99 F | BODY MASS INDEX: 28.9 KG/M2

## 2018-01-24 DIAGNOSIS — E11.40 TYPE 2 DIABETES MELLITUS WITH DIABETIC NEUROPATHY, UNSPECIFIED: ICD-10-CM

## 2018-01-24 LAB
HCT VFR BLD CALC: 35.2 % — SIGNIFICANT CHANGE UP (ref 34.5–45)
HGB BLD-MCNC: 11.5 G/DL — SIGNIFICANT CHANGE UP (ref 11.5–15.5)
MCHC RBC-ENTMCNC: 30 PG — SIGNIFICANT CHANGE UP (ref 27–34)
MCHC RBC-ENTMCNC: 32.8 G/DL — SIGNIFICANT CHANGE UP (ref 32–36)
MCV RBC AUTO: 91.5 FL — SIGNIFICANT CHANGE UP (ref 80–100)
PLATELET # BLD AUTO: 206 K/UL — SIGNIFICANT CHANGE UP (ref 150–400)
RBC # BLD: 3.85 M/UL — SIGNIFICANT CHANGE UP (ref 3.8–5.2)
RBC # FLD: 18.1 % — HIGH (ref 10.3–14.5)
WBC # BLD: 5.6 K/UL — SIGNIFICANT CHANGE UP (ref 3.8–10.5)
WBC # FLD AUTO: 5.6 K/UL — SIGNIFICANT CHANGE UP (ref 3.8–10.5)

## 2018-01-24 PROCEDURE — 99214 OFFICE O/P EST MOD 30 MIN: CPT

## 2018-01-25 LAB
ALBUMIN SERPL ELPH-MCNC: 4.3 G/DL
ALP BLD-CCNC: 73 U/L
ALT SERPL-CCNC: 13 U/L
ANION GAP SERPL CALC-SCNC: 15 MMOL/L
AST SERPL-CCNC: 22 U/L
BILIRUB SERPL-MCNC: 0.4 MG/DL
BUN SERPL-MCNC: 18 MG/DL
CALCIUM SERPL-MCNC: 10.1 MG/DL
CANCER AG27-29 SERPL-ACNC: 30.5 U/ML
CEA SERPL-MCNC: 10.2 NG/ML
CHLORIDE SERPL-SCNC: 98 MMOL/L
CO2 SERPL-SCNC: 29 MMOL/L
CREAT SERPL-MCNC: 1.45 MG/DL
GLUCOSE SERPL-MCNC: 146 MG/DL
POTASSIUM SERPL-SCNC: 3.7 MMOL/L
PROT SERPL-MCNC: 7.5 G/DL
SODIUM SERPL-SCNC: 142 MMOL/L

## 2018-03-08 ENCOUNTER — OUTPATIENT (OUTPATIENT)
Dept: OUTPATIENT SERVICES | Facility: HOSPITAL | Age: 83
LOS: 1 days | Discharge: HOME | End: 2018-03-08

## 2018-03-08 DIAGNOSIS — C50.919 MALIGNANT NEOPLASM OF UNSPECIFIED SITE OF UNSPECIFIED FEMALE BREAST: ICD-10-CM

## 2018-03-08 DIAGNOSIS — Z90.12 ACQUIRED ABSENCE OF LEFT BREAST AND NIPPLE: Chronic | ICD-10-CM

## 2018-03-09 ENCOUNTER — INPATIENT (INPATIENT)
Facility: HOSPITAL | Age: 83
LOS: 6 days | Discharge: HOME | DRG: 597 | End: 2018-03-16
Attending: HOSPITALIST | Admitting: EMERGENCY MEDICINE
Payer: MEDICARE

## 2018-03-09 ENCOUNTER — APPOINTMENT (OUTPATIENT)
Dept: HEMATOLOGY ONCOLOGY | Facility: CLINIC | Age: 83
End: 2018-03-09
Payer: MEDICARE

## 2018-03-09 VITALS
OXYGEN SATURATION: 89 % | RESPIRATION RATE: 18 BRPM | SYSTOLIC BLOOD PRESSURE: 179 MMHG | WEIGHT: 158.95 LBS | DIASTOLIC BLOOD PRESSURE: 93 MMHG | TEMPERATURE: 98 F | HEIGHT: 63 IN | HEART RATE: 103 BPM

## 2018-03-09 VITALS
WEIGHT: 159.61 LBS | TEMPERATURE: 98.1 F | SYSTOLIC BLOOD PRESSURE: 174 MMHG | HEART RATE: 102 BPM | DIASTOLIC BLOOD PRESSURE: 91 MMHG | BODY MASS INDEX: 28.27 KG/M2 | OXYGEN SATURATION: 90 % | RESPIRATION RATE: 16 BRPM

## 2018-03-09 DIAGNOSIS — Z90.12 ACQUIRED ABSENCE OF LEFT BREAST AND NIPPLE: Chronic | ICD-10-CM

## 2018-03-09 DIAGNOSIS — J90 PLEURAL EFFUSION, NOT ELSEWHERE CLASSIFIED: ICD-10-CM

## 2018-03-09 LAB
ALBUMIN SERPL ELPH-MCNC: 3.9 G/DL — SIGNIFICANT CHANGE UP (ref 3.3–5)
ALP SERPL-CCNC: 81 U/L — SIGNIFICANT CHANGE UP (ref 40–120)
ALT FLD-CCNC: 12 U/L RC — SIGNIFICANT CHANGE UP (ref 10–45)
ANION GAP SERPL CALC-SCNC: 15 MMOL/L — SIGNIFICANT CHANGE UP (ref 5–17)
ANISOCYTOSIS BLD QL: SLIGHT — SIGNIFICANT CHANGE UP
APTT BLD: 30.7 SEC — SIGNIFICANT CHANGE UP (ref 27.5–37.4)
AST SERPL-CCNC: 24 U/L — SIGNIFICANT CHANGE UP (ref 10–40)
BASE EXCESS BLDV CALC-SCNC: 2 MMOL/L — SIGNIFICANT CHANGE UP (ref -2–2)
BASOPHILS # BLD AUTO: 0 K/UL — SIGNIFICANT CHANGE UP (ref 0–0.2)
BILIRUB SERPL-MCNC: 0.5 MG/DL — SIGNIFICANT CHANGE UP (ref 0.2–1.2)
BUN SERPL-MCNC: 13 MG/DL — SIGNIFICANT CHANGE UP (ref 7–23)
CA-I SERPL-SCNC: 1.19 MMOL/L — SIGNIFICANT CHANGE UP (ref 1.12–1.3)
CALCIUM SERPL-MCNC: 9.8 MG/DL — SIGNIFICANT CHANGE UP (ref 8.4–10.5)
CHLORIDE BLDV-SCNC: 101 MMOL/L — SIGNIFICANT CHANGE UP (ref 96–108)
CHLORIDE SERPL-SCNC: 95 MMOL/L — LOW (ref 96–108)
CO2 BLDV-SCNC: 29 MMOL/L — SIGNIFICANT CHANGE UP (ref 22–30)
CO2 SERPL-SCNC: 25 MMOL/L — SIGNIFICANT CHANGE UP (ref 22–31)
CREAT SERPL-MCNC: 1.17 MG/DL — SIGNIFICANT CHANGE UP (ref 0.5–1.3)
EOSINOPHIL # BLD AUTO: 0.1 K/UL — SIGNIFICANT CHANGE UP (ref 0–0.5)
EOSINOPHIL NFR BLD AUTO: 1 % — SIGNIFICANT CHANGE UP (ref 0–6)
GAS PNL BLDV: 134 MMOL/L — LOW (ref 136–145)
GAS PNL BLDV: SIGNIFICANT CHANGE UP
GLUCOSE BLDV-MCNC: 177 MG/DL — HIGH (ref 70–99)
GLUCOSE SERPL-MCNC: 174 MG/DL — HIGH (ref 70–99)
HCO3 BLDV-SCNC: 28 MMOL/L — SIGNIFICANT CHANGE UP (ref 21–29)
HCT VFR BLD CALC: 36.9 % — SIGNIFICANT CHANGE UP (ref 34.5–45)
HCT VFR BLDA CALC: 35 % — LOW (ref 39–50)
HGB BLD CALC-MCNC: 11.3 G/DL — LOW (ref 11.5–15.5)
HGB BLD-MCNC: 12.2 G/DL — SIGNIFICANT CHANGE UP (ref 11.5–15.5)
HYPOCHROMIA BLD QL: SLIGHT — SIGNIFICANT CHANGE UP
INR BLD: 1.17 RATIO — HIGH (ref 0.88–1.16)
LACTATE BLDV-MCNC: 2.3 MMOL/L — HIGH (ref 0.7–2)
LYMPHOCYTES # BLD AUTO: 1 K/UL — SIGNIFICANT CHANGE UP (ref 1–3.3)
LYMPHOCYTES # BLD AUTO: 6 % — LOW (ref 13–44)
MACROCYTES BLD QL: SLIGHT — SIGNIFICANT CHANGE UP
MCHC RBC-ENTMCNC: 32.3 PG — SIGNIFICANT CHANGE UP (ref 27–34)
MCHC RBC-ENTMCNC: 33.1 GM/DL — SIGNIFICANT CHANGE UP (ref 32–36)
MCV RBC AUTO: 97.7 FL — SIGNIFICANT CHANGE UP (ref 80–100)
MICROCYTES BLD QL: SLIGHT — SIGNIFICANT CHANGE UP
MONOCYTES # BLD AUTO: 0.9 K/UL — SIGNIFICANT CHANGE UP (ref 0–0.9)
MONOCYTES NFR BLD AUTO: 14 % — SIGNIFICANT CHANGE UP (ref 2–14)
NEUTROPHILS # BLD AUTO: 6.4 K/UL — SIGNIFICANT CHANGE UP (ref 1.8–7.4)
NEUTROPHILS NFR BLD AUTO: 73 % — SIGNIFICANT CHANGE UP (ref 43–77)
NT-PROBNP SERPL-SCNC: 197 PG/ML — SIGNIFICANT CHANGE UP (ref 0–300)
OVALOCYTES BLD QL SMEAR: SLIGHT — SIGNIFICANT CHANGE UP
PCO2 BLDV: 50 MMHG — SIGNIFICANT CHANGE UP (ref 35–50)
PH BLDV: 7.36 — SIGNIFICANT CHANGE UP (ref 7.35–7.45)
PLAT MORPH BLD: NORMAL — SIGNIFICANT CHANGE UP
PLATELET # BLD AUTO: 308 K/UL — SIGNIFICANT CHANGE UP (ref 150–400)
PO2 BLDV: 33 MMHG — SIGNIFICANT CHANGE UP (ref 25–45)
POIKILOCYTOSIS BLD QL AUTO: SLIGHT — SIGNIFICANT CHANGE UP
POTASSIUM BLDV-SCNC: 4.1 MMOL/L — SIGNIFICANT CHANGE UP (ref 3.5–5)
POTASSIUM SERPL-MCNC: 3.9 MMOL/L — SIGNIFICANT CHANGE UP (ref 3.5–5.3)
POTASSIUM SERPL-SCNC: 3.9 MMOL/L — SIGNIFICANT CHANGE UP (ref 3.5–5.3)
PROT SERPL-MCNC: 8.7 G/DL — HIGH (ref 6–8.3)
PROTHROM AB SERPL-ACNC: 12.7 SEC — SIGNIFICANT CHANGE UP (ref 9.8–12.7)
RBC # BLD: 3.78 M/UL — LOW (ref 3.8–5.2)
RBC # FLD: 17.3 % — HIGH (ref 10.3–14.5)
RBC BLD AUTO: ABNORMAL
SAO2 % BLDV: 53 % — LOW (ref 67–88)
SODIUM SERPL-SCNC: 135 MMOL/L — SIGNIFICANT CHANGE UP (ref 135–145)
TARGETS BLD QL SMEAR: SLIGHT — SIGNIFICANT CHANGE UP
TROPONIN T SERPL-MCNC: <0.01 NG/ML — SIGNIFICANT CHANGE UP (ref 0–0.06)
VARIANT LYMPHS # BLD: 6 % — SIGNIFICANT CHANGE UP (ref 0–6)
WBC # BLD: 8.4 K/UL — SIGNIFICANT CHANGE UP (ref 3.8–10.5)
WBC # FLD AUTO: 8.4 K/UL — SIGNIFICANT CHANGE UP (ref 3.8–10.5)

## 2018-03-09 PROCEDURE — 71275 CT ANGIOGRAPHY CHEST: CPT | Mod: 26

## 2018-03-09 PROCEDURE — 93308 TTE F-UP OR LMTD: CPT | Mod: 26

## 2018-03-09 PROCEDURE — 99223 1ST HOSP IP/OBS HIGH 75: CPT

## 2018-03-09 PROCEDURE — 99497 ADVNCD CARE PLAN 30 MIN: CPT | Mod: 25

## 2018-03-09 PROCEDURE — 71045 X-RAY EXAM CHEST 1 VIEW: CPT | Mod: 26

## 2018-03-09 PROCEDURE — 93010 ELECTROCARDIOGRAM REPORT: CPT

## 2018-03-09 PROCEDURE — 99291 CRITICAL CARE FIRST HOUR: CPT | Mod: 25,GC

## 2018-03-09 PROCEDURE — 99499A: CUSTOM | Mod: NC

## 2018-03-09 RX ORDER — ENOXAPARIN SODIUM 100 MG/ML
70 INJECTION SUBCUTANEOUS ONCE
Qty: 0 | Refills: 0 | Status: COMPLETED | OUTPATIENT
Start: 2018-03-09 | End: 2018-03-09

## 2018-03-09 RX ORDER — SODIUM CHLORIDE 9 MG/ML
500 INJECTION INTRAMUSCULAR; INTRAVENOUS; SUBCUTANEOUS ONCE
Qty: 0 | Refills: 0 | Status: COMPLETED | OUTPATIENT
Start: 2018-03-09 | End: 2018-03-09

## 2018-03-09 RX ADMIN — ENOXAPARIN SODIUM 70 MILLIGRAM(S): 100 INJECTION SUBCUTANEOUS at 21:59

## 2018-03-09 RX ADMIN — SODIUM CHLORIDE 500 MILLILITER(S): 9 INJECTION INTRAMUSCULAR; INTRAVENOUS; SUBCUTANEOUS at 18:02

## 2018-03-09 NOTE — ED PROVIDER NOTE - MEDICAL DECISION MAKING DETAILS
83yo F with metastatic breast CA with SOB/hypoxia/tachycardia, PE high on differential as well as malignant pleural effusion. less likely cardiac without any h/o and no crackles/LE edema. no infectious symptoms. will get labs, CXR, CTA tba 81yo F with metastatic breast CA with SOB/hypoxia/tachycardia, PE high on differential as well as malignant pleural effusion. less likely cardiac without any h/o and no crackles/LE edema. no infectious symptoms. will get labs, CXR, CTA tba  Attending Hermelindo: 81 y/o female h/o metastatic breast ca on chemo presenting with worsening sob. upon arrival pt hypoxic and tachycardic. pocus shows evidence of large pleural effusion which could be responsible for symptoms however with risk factors will also perform CTA to further evaluate. no fevers or chills. plan to admit

## 2018-03-09 NOTE — ED PROVIDER NOTE - OBJECTIVE STATEMENT
81yo F with metastatic breast CA on PO chemo 2-3 days of worsening SOB on rest and worse with exertion. no orthopnea. no LE edema. no CP. mild cough. no fever/chills. no h/o PE/DVT. no bleeding.     Melissa Silverman

## 2018-03-09 NOTE — ED PROVIDER NOTE - PROGRESS NOTE DETAILS
MD Lyn: Patient resting comfortably on nasal canula, no shortness of breath at rest,  found to have right effusion that likely needs drainage, also with LLL subsegmental PE, started on lovenox no right heart strain, called by rads waiting for final report, patient discussed with hospitalist for admission.

## 2018-03-09 NOTE — ED PROVIDER NOTE - NS ED ROS FT
ROS: no CP +SOB. +cough. no fever. no n/v/d/c. no abd pain. no rash. no bleeding. no urinary complaints. no weakness. no vision changes. no HA. no neck/back pain. no extremity swelling/deformity. No change in mental status.

## 2018-03-09 NOTE — ED PROVIDER NOTE - PHYSICAL EXAMINATION
Gen: mild respiratory distress, AOx3  Head: NCAT  HEENT: PERRL, oral mucosa moist, normal conjunctiva, neck supple  Lung: decreased BS b/l bases, mild respiratory distress with tachypnea, no increased WOB  CV: tachy regular, no murmur, Normal perfusion  Abd: soft, NTND  MSK: No edema, no visible deformities  Neuro: No focal neurologic deficits  Skin: No rash   Psych: normal affect Gen: mild respiratory distress, AOx3  Head: NCAT  HEENT: PERRL, oral mucosa moist, normal conjunctiva, neck supple  Lung: decreased BS b/l bases, mild respiratory distress with tachypnea, no increased WOB  CV: tachy regular, no murmur, Normal perfusion  Abd: soft, NTND  MSK: No edema, no visible deformities  Neuro: No focal neurologic deficits  Skin: No rash   Psych: normal affect  Attending Casarez: gen: tachypnic, heent: atraumatic, eomi, perrla, mmm, neck: nttp, cv: tachycardic. lungs: decreased breath sounds no right. abd: soft, nontender, nonddistended, ext: wwp, skin: no rash. neuro: awake and alert following commands

## 2018-03-09 NOTE — ED ADULT NURSE NOTE - OBJECTIVE STATEMENT
Patient  is  alert   and  oriented  x3.  Color  is  good  and  skin warm to touch.  She  is  c/o  SOB.  She  denies  chest  pain  or  cough.

## 2018-03-10 DIAGNOSIS — E11.9 TYPE 2 DIABETES MELLITUS WITHOUT COMPLICATIONS: ICD-10-CM

## 2018-03-10 DIAGNOSIS — C50.912 MALIGNANT NEOPLASM OF UNSPECIFIED SITE OF LEFT FEMALE BREAST: ICD-10-CM

## 2018-03-10 DIAGNOSIS — J98.19 OTHER PULMONARY COLLAPSE: ICD-10-CM

## 2018-03-10 DIAGNOSIS — J96.01 ACUTE RESPIRATORY FAILURE WITH HYPOXIA: ICD-10-CM

## 2018-03-10 DIAGNOSIS — Z29.9 ENCOUNTER FOR PROPHYLACTIC MEASURES, UNSPECIFIED: ICD-10-CM

## 2018-03-10 DIAGNOSIS — I26.99 OTHER PULMONARY EMBOLISM WITHOUT ACUTE COR PULMONALE: ICD-10-CM

## 2018-03-10 DIAGNOSIS — J90 PLEURAL EFFUSION, NOT ELSEWHERE CLASSIFIED: ICD-10-CM

## 2018-03-10 DIAGNOSIS — Z71.89 OTHER SPECIFIED COUNSELING: ICD-10-CM

## 2018-03-10 PROCEDURE — 93970 EXTREMITY STUDY: CPT | Mod: 26

## 2018-03-10 PROCEDURE — 99223 1ST HOSP IP/OBS HIGH 75: CPT

## 2018-03-10 PROCEDURE — 93306 TTE W/DOPPLER COMPLETE: CPT | Mod: 26

## 2018-03-10 PROCEDURE — 99233 SBSQ HOSP IP/OBS HIGH 50: CPT | Mod: GC

## 2018-03-10 RX ORDER — DEXTROSE 50 % IN WATER 50 %
25 SYRINGE (ML) INTRAVENOUS ONCE
Qty: 0 | Refills: 0 | Status: DISCONTINUED | OUTPATIENT
Start: 2018-03-10 | End: 2018-03-16

## 2018-03-10 RX ORDER — DEXTROSE 50 % IN WATER 50 %
1 SYRINGE (ML) INTRAVENOUS ONCE
Qty: 0 | Refills: 0 | Status: DISCONTINUED | OUTPATIENT
Start: 2018-03-10 | End: 2018-03-16

## 2018-03-10 RX ORDER — GABAPENTIN 400 MG/1
300 CAPSULE ORAL
Qty: 0 | Refills: 0 | Status: DISCONTINUED | OUTPATIENT
Start: 2018-03-10 | End: 2018-03-16

## 2018-03-10 RX ORDER — AMLODIPINE BESYLATE 2.5 MG/1
10 TABLET ORAL DAILY
Qty: 0 | Refills: 0 | Status: DISCONTINUED | OUTPATIENT
Start: 2018-03-10 | End: 2018-03-16

## 2018-03-10 RX ORDER — OXYCODONE AND ACETAMINOPHEN 5; 325 MG/1; MG/1
1 TABLET ORAL EVERY 6 HOURS
Qty: 0 | Refills: 0 | Status: DISCONTINUED | OUTPATIENT
Start: 2018-03-10 | End: 2018-03-16

## 2018-03-10 RX ORDER — DEXTROSE 50 % IN WATER 50 %
12.5 SYRINGE (ML) INTRAVENOUS ONCE
Qty: 0 | Refills: 0 | Status: DISCONTINUED | OUTPATIENT
Start: 2018-03-10 | End: 2018-03-16

## 2018-03-10 RX ORDER — GABAPENTIN 400 MG/1
1 CAPSULE ORAL
Qty: 0 | Refills: 0 | COMMUNITY

## 2018-03-10 RX ORDER — FAMOTIDINE 10 MG/ML
0 INJECTION INTRAVENOUS
Qty: 0 | Refills: 0 | COMMUNITY

## 2018-03-10 RX ORDER — METFORMIN HYDROCHLORIDE 850 MG/1
1 TABLET ORAL
Qty: 0 | Refills: 0 | COMMUNITY

## 2018-03-10 RX ORDER — LABETALOL HCL 100 MG
100 TABLET ORAL
Qty: 0 | Refills: 0 | Status: DISCONTINUED | OUTPATIENT
Start: 2018-03-10 | End: 2018-03-16

## 2018-03-10 RX ORDER — POLYETHYLENE GLYCOL 3350 17 G/17G
17 POWDER, FOR SOLUTION ORAL
Qty: 0 | Refills: 0 | Status: DISCONTINUED | OUTPATIENT
Start: 2018-03-10 | End: 2018-03-16

## 2018-03-10 RX ORDER — SENNA PLUS 8.6 MG/1
2 TABLET ORAL AT BEDTIME
Qty: 0 | Refills: 0 | Status: DISCONTINUED | OUTPATIENT
Start: 2018-03-10 | End: 2018-03-16

## 2018-03-10 RX ORDER — INSULIN LISPRO 100/ML
VIAL (ML) SUBCUTANEOUS
Qty: 0 | Refills: 0 | Status: DISCONTINUED | OUTPATIENT
Start: 2018-03-10 | End: 2018-03-16

## 2018-03-10 RX ORDER — SODIUM CHLORIDE 9 MG/ML
1000 INJECTION, SOLUTION INTRAVENOUS
Qty: 0 | Refills: 0 | Status: DISCONTINUED | OUTPATIENT
Start: 2018-03-10 | End: 2018-03-16

## 2018-03-10 RX ORDER — CELECOXIB 200 MG/1
0 CAPSULE ORAL
Qty: 0 | Refills: 0 | COMMUNITY

## 2018-03-10 RX ORDER — GLUCAGON INJECTION, SOLUTION 0.5 MG/.1ML
1 INJECTION, SOLUTION SUBCUTANEOUS ONCE
Qty: 0 | Refills: 0 | Status: DISCONTINUED | OUTPATIENT
Start: 2018-03-10 | End: 2018-03-16

## 2018-03-10 RX ORDER — INFLUENZA VIRUS VACCINE 15; 15; 15; 15 UG/.5ML; UG/.5ML; UG/.5ML; UG/.5ML
0.5 SUSPENSION INTRAMUSCULAR ONCE
Qty: 0 | Refills: 0 | Status: COMPLETED | OUTPATIENT
Start: 2018-03-10 | End: 2018-03-10

## 2018-03-10 RX ORDER — ENOXAPARIN SODIUM 100 MG/ML
70 INJECTION SUBCUTANEOUS
Qty: 0 | Refills: 0 | Status: DISCONTINUED | OUTPATIENT
Start: 2018-03-10 | End: 2018-03-10

## 2018-03-10 RX ORDER — INSULIN LISPRO 100/ML
VIAL (ML) SUBCUTANEOUS AT BEDTIME
Qty: 0 | Refills: 0 | Status: DISCONTINUED | OUTPATIENT
Start: 2018-03-10 | End: 2018-03-16

## 2018-03-10 RX ORDER — DOCUSATE SODIUM 100 MG
100 CAPSULE ORAL THREE TIMES A DAY
Qty: 0 | Refills: 0 | Status: DISCONTINUED | OUTPATIENT
Start: 2018-03-10 | End: 2018-03-16

## 2018-03-10 RX ADMIN — GABAPENTIN 300 MILLIGRAM(S): 400 CAPSULE ORAL at 17:17

## 2018-03-10 RX ADMIN — GABAPENTIN 300 MILLIGRAM(S): 400 CAPSULE ORAL at 05:06

## 2018-03-10 RX ADMIN — Medication 100 MILLIGRAM(S): at 22:00

## 2018-03-10 RX ADMIN — Medication 100 MILLIGRAM(S): at 17:18

## 2018-03-10 RX ADMIN — Medication 100 MILLIGRAM(S): at 13:43

## 2018-03-10 RX ADMIN — AMLODIPINE BESYLATE 10 MILLIGRAM(S): 2.5 TABLET ORAL at 05:06

## 2018-03-10 RX ADMIN — Medication 100 MILLIGRAM(S): at 05:05

## 2018-03-10 RX ADMIN — Medication 100 MILLIGRAM(S): at 05:06

## 2018-03-10 RX ADMIN — ENOXAPARIN SODIUM 70 MILLIGRAM(S): 100 INJECTION SUBCUTANEOUS at 05:06

## 2018-03-10 NOTE — CONSULT NOTE ADULT - ASSESSMENT
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 diagnosed in 2014 with clinical axillary node involvement on presentation s/p treatment with adriamycin for cycle 1 then AC x 4, with subsequent weekly taxol which was changed to taxotere after development of peripheral neuropathy after 4 cycles, completed on 3/6/15, with subsequent L modified radical mastectomy and axillary LN dissection only showing in situ disease, s/p XRT completed August 2015, with palpable axillary mass in May 2017 with solid mass biopsy showing recurrent triple negative breast cancer with mets to deltoid and chest muscles and negative bone scan, androgen receptor 50% positive and now on single agent Xeloda since November 2017, now with worsening SOB and large R pleural effusion and L subsegmental PE.

## 2018-03-10 NOTE — CONSULT NOTE ADULT - SUBJECTIVE AND OBJECTIVE BOX
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 diagnosed in  with clinical axillary node involvement on presentation s/p treatment with adriamycin for cycle 1 then AC x 4, with subsequent weekly taxol which was changed to taxotere after development of peripheral neuropathy after 4 cycles, completed on 3/6/15, with subsequent L modified radical mastectomy and axillary LN dissection only showing in situ disease, s/p XRT completed 2015, with palpable axillary mass in May 2017 with solid mass biopsy showing recurrent triple negative breast cancer with mets to deltoid and chest muscles and negative bone scan, androgen receptor 50% positive and now on single agent Xeloda since 2017.  Patient also with history of chronic right pleural effusion since November, grade 1 DHF, DM2, HTN, HLD and cataracts, and she is p/w progressive worsening SOB over the past 3 days. She states that she had been feeling weak for the past 1 month. Her Xeloda has been one week on one week off. Otherwise states she was in usual state of health until about 2 days ago, when she has started to feel more SOB. Initially, the SOB and tiredness with palpitations occurred only on ambulating, but states that it has progressed so that she feels a little SOB at rest as well. She feels better when she's lying down. Denies calf tenderness. Reports mild CP that radiates up from her epigastrium. Denies fevers, chills, diarrhea. Reports constipation, mild nausea and one episode of vomiting. Denies bleeding from any source, and reports brown stool. Review of CT imaging shows no pleural effusion in , but new moderate right pleural effusion in . Ct scan showing large pleural effusion on R with subsegmental PE seen on L side.       PAST MEDICAL & SURGICAL HISTORY:  Chest mass  H/O lymphadenopathy: left arm  Arthritis of spine  H/O neuropathy: Since chemotherapy  HTN (hypertension)  Hyperlipidemia  Diabetes: diabetes mellitus type II  fingerstick range in the mornig 110-120 mg/ dl  Breast cancer, left  H/O mastectomy, left: 2015  History of hysterectomy  History of  section      Allergies    dust (Other)  No Known Drug Allergies    Intolerances    codeine (Nausea)      MEDICATIONS  (STANDING):  amLODIPine   Tablet 10 milliGRAM(s) Oral daily  docusate sodium 100 milliGRAM(s) Oral three times a day  gabapentin 300 milliGRAM(s) Oral two times a day  influenza   Vaccine 0.5 milliLiter(s) IntraMuscular once  labetalol 100 milliGRAM(s) Oral two times a day    MEDICATIONS  (PRN):  oxyCODONE    5 mG/acetaminophen 325 mG 1 Tablet(s) Oral every 6 hours PRN Moderate Pain (4 - 6)  polyethylene glycol 3350 17 Gram(s) Oral two times a day PRN Constipation  senna 2 Tablet(s) Oral at bedtime PRN Constipation      FAMILY HISTORY:  No pertinent family history in first degree relatives      SOCIAL HISTORY: Former smoker (40 pys),  and lives with children.    REVIEW OF SYSTEMS:    CONSTITUTIONAL: + weakness, no fevers or chills  EYES/ENT: No visual changes;  No vertigo or throat pain   NECK: No pain or stiffness  RESPIRATORY: +dyspnea at rest  CARDIOVASCULAR: + chest pain, no palpitations  GASTROINTESTINAL: No abdominal or epigastric pain. No nausea, vomiting, or hematemesis; No diarrhea or constipation. No melena or hematochezia.  GENITOURINARY: No dysuria, frequency or hematuria  NEUROLOGICAL: No numbness or weakness  SKIN: No itching, burning, rashes, or lesions   All other review of systems is negative unless indicated above.    Height (cm): 160.02 ( @ 15:30)  Weight (kg): 72.1 ( @ 15:30)  BMI (kg/m2): 28.2 ( @ 15:30)  BSA (m2): 1.75 ( @ 15:30)    T(F): 98.1 (03-10-18 @ 08:45), Max: 98.5 (03-10-18 @ 00:00)  HR: 89 (03-10-18 @ 08:45)  BP: 143/76 (03-10-18 @ 08:45)  RR: 18 (03-10-18 @ 08:45)  SpO2: 96% (03-10-18 @ 08:45)  Wt(kg): --    GENERAL: NAD, well-developed  HEAD:  Atraumatic, Normocephalic  EYES: EOMI, PERRLA, conjunctiva and sclera clear  NECK: Supple, No JVD  CHEST/LUNG: mostly absent breath sounds on R lung, good air movement in L lung.   HEART: Regular rate and rhythm; No murmurs, rubs, or gallops  ABDOMEN: Soft, Nontender, Nondistended; Bowel sounds present  EXTREMITIES:  2+ Peripheral Pulses, No clubbing, cyanosis, or edema  NEUROLOGY: non-focal  SKIN: Mastectomy scar on L chest.                           12.2   8.4   )-----------( 308      ( 09 Mar 2018 16:50 )             36.9       03-    135  |  95<L>  |  13  ----------------------------<  174<H>  3.9   |  25  |  1.17    Ca    9.8      09 Mar 2018 16:50    TPro  8.7<H>  /  Alb  3.9  /  TBili  0.5  /  DBili  x   /  AST  24  /  ALT  12  /  AlkPhos  81  -09          PT/INR - ( 09 Mar 2018 16:50 )   PT: 12.7 sec;   INR: 1.17 ratio         PTT - ( 09 Mar 2018 16:50 )  PTT:30.7 sec      < from: CT Angio Chest w/ IV Cont (18 @ 19:07) >  IMPRESSION: Focal subsegmental left lower lobe pulmonary embolism.   Limited evaluation of the right segmental branches due to pulmonary   collapse.    Large right pleural effusion with complete collapse of the right lung.    < end of copied text >

## 2018-03-10 NOTE — PROGRESS NOTE ADULT - PROBLEM SELECTOR PLAN 7
- pt wishes all 3 of her children to make decisions for her if she is unable to  - pt wishes for full code  - time spent 20 min - pt wishes all 3 of her children to make decisions for her if she is unable to  - pt wishes for full code

## 2018-03-10 NOTE — CONSULT NOTE ADULT - SUBJECTIVE AND OBJECTIVE BOX
CHIEF COMPLAINT:  82F c hx metastatic left infiltrating ductal breast cancer (dx 2015, triple negative), s/p left modified radical mastectomy, RT, chemo, c/b mets to left deltoid and chest wall muscles, probably benign liver lesions, chronic right pleural effusion of unknown etiology, grade 1 DHF, DM2, HTN, HLD, neuropathy 2/2 chemo, cataracts, pw progressive worsening SOB over the past 3 days.  Pt states that she has been feeling weak for the past 1 month. She has been taking xeloda oral chemo at home every other week. Otherwise states she was in usual state of health until about 2 days ago, when she has started to feel more SOB. Initially, the SOB and tiredness with palpitations occurred only on ambulating, but states that it has progressed so that she feels a little SOB at rest as well. She feels better when she's lying down. Denies calf tenderness. Reports mild CP that radiates up from her epigastrium. Denies fevers, chills, diarrhea. Reports constipation, mild nausea and one episode of vomiting. Denies bleeding from any source, and reports brown stool      PAST MEDICAL & SURGICAL HISTORY:  Chest mass  H/O lymphadenopathy: left arm  Arthritis of spine  H/O neuropathy: Since chemotherapy  HTN (hypertension)  Hyperlipidemia  Diabetes: diabetes mellitus type II  fingerstick range in the mornig 110-120 mg/ dl  Breast cancer, left  H/O mastectomy, left:   History of hysterectomy  History of  section      FAMILY HISTORY:  No pertinent family history in first degree relatives      SOCIAL HISTORY:  reviewed in H+P      Allergies    dust (Other)  No Known Drug Allergies    Intolerances    codeine (Nausea)      HOME MEDICATIONS:    REVIEW OF SYSTEMS:  CONSTITUTIONAL: +weakness. No fevers. No chills. No rigors. +weight loss. +poor appetite.  	EYES: No visual changes. No eye pain.  	ENT: No hearing difficulty. No vertigo. No dysphagia. No Sinusitis/rhinorrhea.  	NECK: No pain. No stiffness/rigidity.  	CARDIAC: +chest pain. +palpitations.  	RESPIRATORY: No cough. +SOB. No hemoptysis.  	GASTROINTESTINAL: +abdominal pain. +nausea. +vomiting. No hematemesis. No diarrhea. +constipation. No melena. No hematochezia.  	GENITOURINARY: No dysuria. No frequency. No hesitancy. No hematuria.  	NEUROLOGICAL: No numbness/tingling. No focal weakness. No incontinence. No headache.  	BACK: +back pain.  	EXTREMITIES: No lower extremity edema. Full ROM.  	SKIN: No rashes. No itching. No other lesions.  	ALLERGIC: No lip swelling. No hives.    OBJECTIVE:  ICU Vital Signs Last 24 Hrs  T(C): 36.7 (10 Mar 2018 04:24), Max: 36.9 (10 Mar 2018 00:00)  T(F): 98.1 (10 Mar 2018 04:24), Max: 98.5 (10 Mar 2018 00:00)  HR: 91 (10 Mar 2018 04:24) (91 - 104)  BP: 152/76 (10 Mar 2018 04:24) (152/76 - 179/93)  BP(mean): --  ABP: --  ABP(mean): --  RR: 18 (10 Mar 2018 04:24) (18 - 21)  SpO2: 96% (10 Mar 2018 04:24) (89% - 98%)        03-09 @ 07:01  -  -10 @ 07:00  --------------------------------------------------------  IN: 120 mL / OUT: 0 mL / NET: 120 mL      CAPILLARY BLOOD GLUCOSE          PHYSICAL EXAM:   EOMI. PERRLA. Normal conjunctiva/sclera.  ENT: Neck supple. No JVD. Moist oral mucosa. Not edentulous. No thrush.  LYMPH: Normal supraclavicular/cervical lymph nodes.   CARDIAC: Tachycardiac Regular rate. S1. S2. grade 2 syst murmur. No rub. No distant heart sounds.  LUNG/CHEST: Absent breath sounds on right. No wheezes. No rales. No rhonchi.  ABDOMEN: Soft. No tenderness. No distension. No fluid wave. Normal bowel sounds.  BACK: No midline/vertebral tenderness. No flank tenderness.  VASCULAR: +2 b/l radial or ulnar pulses. Palpable DP pulses.  EXTREMITIES:  No clubbing. No cyanosis. Mild +1 LLE/LUE nonpitting edema. Moving all 4.  NEUROLOGY: A&Ox3. Non-focal exam. Cranial nerves intact. Normal speech. Sensation intact.  PSYCH: Normal behavior. Normal affect.  SKIN: No jaundice. No erythema. No rash/lesion.    HOSPITAL MEDICATIONS:  MEDICATIONS  (STANDING):  amLODIPine   Tablet 10 milliGRAM(s) Oral daily  docusate sodium 100 milliGRAM(s) Oral three times a day  gabapentin 300 milliGRAM(s) Oral two times a day  influenza   Vaccine 0.5 milliLiter(s) IntraMuscular once  labetalol 100 milliGRAM(s) Oral two times a day    MEDICATIONS  (PRN):  oxyCODONE    5 mG/acetaminophen 325 mG 1 Tablet(s) Oral every 6 hours PRN Moderate Pain (4 - 6)  polyethylene glycol 3350 17 Gram(s) Oral two times a day PRN Constipation  senna 2 Tablet(s) Oral at bedtime PRN Constipation      LABS:                        12.2   8.4   )-----------( 308      ( 09 Mar 2018 16:50 )             36.9         135  |  95<L>  |  13  ----------------------------<  174<H>  3.9   |  25  |  1.17    Ca    9.8      09 Mar 2018 16:50    TPro  8.7<H>  /  Alb  3.9  /  TBili  0.5  /  DBili  x   /  AST  24  /  ALT  12  /  AlkPhos  81      PT/INR - ( 09 Mar 2018 16:50 )   PT: 12.7 sec;   INR: 1.17 ratio         PTT - ( 09 Mar 2018 16:50 )  PTT:30.7 sec      Venous Blood Gas:   @ 16:50  7.36/50/33/28/53  VBG Lactate: 2.3      MICROBIOLOGY:     RADIOLOGY:  [ ] Reviewed and interpreted by me    PULMONARY FUNCTION TESTS:    EKG:

## 2018-03-10 NOTE — H&P ADULT - PROBLEM SELECTOR PLAN 6
- pt wishes all 3 of her children to make decisions for her if she is unable to  - pt wishes for full code  - time spent 20 min - well controlled  - ISS

## 2018-03-10 NOTE — H&P ADULT - NSHPLABSRESULTS_GEN_ALL_CORE
Personally reviewed labs.   Personally reviewed imaging.   Personally reviewed EKG. NSR, rate 91, . RBBB. Wide QRS. TWI in V1-V3. No ST change.                          12.2   8.4   )-----------( 308      ( 09 Mar 2018 16:50 )             36.9       03-09    135  |  95<L>  |  13  ----------------------------<  174<H>  3.9   |  25  |  1.17    Ca    9.8      09 Mar 2018 16:50    TPro  8.7<H>  /  Alb  3.9  /  TBili  0.5  /  DBili  x   /  AST  24  /  ALT  12  /  AlkPhos  81  03-09      CARDIAC MARKERS ( 09 Mar 2018 16:50 )  x     / <0.01 ng/mL / x     / x     / x            LIVER FUNCTIONS - ( 09 Mar 2018 16:50 )  Alb: 3.9 g/dL / Pro: 8.7 g/dL / ALK PHOS: 81 U/L / ALT: 12 U/L RC / AST: 24 U/L / GGT: x             PT/INR - ( 09 Mar 2018 16:50 )   PT: 12.7 sec;   INR: 1.17 ratio         PTT - ( 09 Mar 2018 16:50 )  PTT:30.7 sec Personally reviewed labs.   Personally reviewed imaging.   Personally reviewed EKG. NSR, rate 91, . RBBB. Wide QRS. TWI in V1-V3. No ST change. Grossly similar to EKG in 2015.                          12.2   8.4   )-----------( 308      ( 09 Mar 2018 16:50 )             36.9       03-09    135  |  95<L>  |  13  ----------------------------<  174<H>  3.9   |  25  |  1.17    Ca    9.8      09 Mar 2018 16:50    TPro  8.7<H>  /  Alb  3.9  /  TBili  0.5  /  DBili  x   /  AST  24  /  ALT  12  /  AlkPhos  81  03-09      CARDIAC MARKERS ( 09 Mar 2018 16:50 )  x     / <0.01 ng/mL / x     / x     / x            LIVER FUNCTIONS - ( 09 Mar 2018 16:50 )  Alb: 3.9 g/dL / Pro: 8.7 g/dL / ALK PHOS: 81 U/L / ALT: 12 U/L RC / AST: 24 U/L / GGT: x             PT/INR - ( 09 Mar 2018 16:50 )   PT: 12.7 sec;   INR: 1.17 ratio         PTT - ( 09 Mar 2018 16:50 )  PTT:30.7 sec

## 2018-03-10 NOTE — H&P ADULT - PROBLEM SELECTOR PROBLEM 6
Advanced care planning/counseling discussion Type 2 diabetes mellitus without complication, without long-term current use of insulin

## 2018-03-10 NOTE — PROGRESS NOTE ADULT - SUBJECTIVE AND OBJECTIVE BOX
Patient is a 82y old  Female who presents with a chief complaint of SOB, tiredness (10 Mar 2018 00:04)      INTERVAL HPI/OVERNIGHT EVENTS:  T(C): 37 (03-10-18 @ 11:47), Max: 37 (03-10-18 @ 11:47)  HR: 88 (03-10-18 @ 11:47) (88 - 100)  BP: 158/81 (03-10-18 @ 11:47) (143/76 - 160/108)  RR: 18 (03-10-18 @ 11:47) (18 - 21)  SpO2: 94% (03-10-18 @ 11:47) (94% - 96%)  Wt(kg): --  I&O's Summary    09 Mar 2018 07:01  -  10 Mar 2018 07:00  --------------------------------------------------------  IN: 120 mL / OUT: 0 mL / NET: 120 mL    10 Mar 2018 06:01  -  10 Mar 2018 19:40  --------------------------------------------------------  IN: 480 mL / OUT: 450 mL / NET: 30 mL        LABS:                        12.2   8.4   )-----------( 308      ( 09 Mar 2018 16:50 )             36.9     03-09    135  |  95<L>  |  13  ----------------------------<  174<H>  3.9   |  25  |  1.17    Ca    9.8      09 Mar 2018 16:50    TPro  8.7<H>  /  Alb  3.9  /  TBili  0.5  /  DBili  x   /  AST  24  /  ALT  12  /  AlkPhos  81  03-09    PT/INR - ( 09 Mar 2018 16:50 )   PT: 12.7 sec;   INR: 1.17 ratio         PTT - ( 09 Mar 2018 16:50 )  PTT:30.7 sec    CAPILLARY BLOOD GLUCOSE              REVIEW OF SYSTEMS:  CONSTITUTIONAL: No fever, weight loss, or fatigue  EYES: No eye pain, visual disturbances, or discharge  ENMT:  No difficulty hearing, tinnitus, vertigo; No sinus or throat pain  NECK: No pain or stiffness  BREASTS: No pain, masses, or nipple discharge  RESPIRATORY: No cough, wheezing, chills or hemoptysis; No shortness of breath  CARDIOVASCULAR: No chest pain, palpitations, dizziness, or leg swelling  GASTROINTESTINAL: No abdominal or epigastric pain. No nausea, vomiting, or hematemesis; No diarrhea or constipation. No melena or hematochezia.  GENITOURINARY: No dysuria, frequency, hematuria, or incontinence  NEUROLOGICAL: No headaches, memory loss, loss of strength, numbness, or tremors  SKIN: No itching, burning, rashes, or lesions   LYMPH NODES: No enlarged glands  ENDOCRINE: No heat or cold intolerance; No hair loss  MUSCULOSKELETAL: No joint pain or swelling; No muscle, back, or extremity pain  PSYCHIATRIC: No depression, anxiety, mood swings, or difficulty sleeping  HEME/LYMPH: No easy bruising, or bleeding gums  ALLERY AND IMMUNOLOGIC: No hives or eczema    RADIOLOGY & ADDITIONAL TESTS:    Imaging Personally Reviewed:  [ ] YES  [ ] NO    Consultant(s) Notes Reviewed:  [ ] YES  [ ] NO    PHYSICAL EXAM:  GENERAL: NAD, well-groomed, well-developed  HEAD:  Atraumatic, Normocephalic  EYES: EOMI, PERRLA, conjunctiva and sclera clear  ENMT: No tonsillar erythema, exudates, or enlargement; Moist mucous membranes, Good dentition, No lesions  NECK: Supple, No JVD, Normal thyroid  NERVOUS SYSTEM:  Alert & Oriented X3, Good concentration; Motor Strength 5/5 B/L upper and lower extremities; DTRs 2+ intact and symmetric  CHEST/LUNG: Clear to percussion bilaterally; No rales, rhonchi, wheezing, or rubs  HEART: Regular rate and rhythm; No murmurs, rubs, or gallops  ABDOMEN: Soft, Nontender, Nondistended; Bowel sounds present  EXTREMITIES:  2+ Peripheral Pulses, No clubbing, cyanosis, or edema  LYMPH: No lymphadenopathy noted  SKIN: No rashes or lesions    Care Discussed with Consultants/Other Providers [ ] YES  [ ] NO Patient is a 82y old  Female who presents with a chief complaint of SOB, tiredness (10 Mar 2018 00:04)      INTERVAL HPI/OVERNIGHT EVENTS:  Admitted for acute dyspnea on exertion and at rest. CTA showing subsegmental L sided PE, and complete collapse of R lung with surrounding massive pleural effusion.     T(C): 37 (03-10-18 @ 11:47), Max: 37 (03-10-18 @ 11:47)  HR: 88 (03-10-18 @ 11:47) (88 - 100)  BP: 158/81 (03-10-18 @ 11:47) (143/76 - 160/108)  RR: 18 (03-10-18 @ 11:47) (18 - 21)  SpO2: 94% (03-10-18 @ 11:47) (94% - 96%)  Wt(kg): --  I&O's Summary    09 Mar 2018 07:01  -  10 Mar 2018 07:00  --------------------------------------------------------  IN: 120 mL / OUT: 0 mL / NET: 120 mL    10 Mar 2018 06:01  -  10 Mar 2018 19:40  --------------------------------------------------------  IN: 480 mL / OUT: 450 mL / NET: 30 mL        LABS:                        12.2   8.4   )-----------( 308      ( 09 Mar 2018 16:50 )             36.9     03-09    135  |  95<L>  |  13  ----------------------------<  174<H>  3.9   |  25  |  1.17    Ca    9.8      09 Mar 2018 16:50    TPro  8.7<H>  /  Alb  3.9  /  TBili  0.5  /  DBili  x   /  AST  24  /  ALT  12  /  AlkPhos  81  03-09    PT/INR - ( 09 Mar 2018 16:50 )   PT: 12.7 sec;   INR: 1.17 ratio         PTT - ( 09 Mar 2018 16:50 )  PTT:30.7 sec    CAPILLARY BLOOD GLUCOSE      REVIEW OF SYSTEMS:  CONSTITUTIONAL: + weakness, no fevers or chills  EYES/ENT: No visual changes;  No vertigo or throat pain   NECK: No pain or stiffness  RESPIRATORY: +dyspnea at rest  CARDIOVASCULAR: + chest pain, no palpitations  GASTROINTESTINAL: No abdominal or epigastric pain. No nausea, vomiting, or hematemesis; No diarrhea or constipation. No melena or hematochezia.  GENITOURINARY: No dysuria, frequency or hematuria  NEUROLOGICAL: No numbness or weakness    RADIOLOGY & ADDITIONAL TESTS:    Imaging Personally Reviewed:  [X ] YES  [ ] NO  Focal subsegmental left lower lobe pulmonary embolism. Limited   evaluation of the right segmental branches due to pulmonary collapse.     Large right pleural effusion with complete collapse of the right lung.       Consultant(s) Notes Reviewed:  [X ] YES  [ ] NO    PHYSICAL EXAM:  GENERAL: Frail; NAD  HEAD:  Atraumatic, Normocephalic  EOMI. PERRLA. Normal conjunctiva/sclera.  ENT: Neck supple. No JVD. Moist oral mucosa. Not edentulous. No thrush.  LYMPH: Normal supraclavicular/cervical lymph nodes.   CARDIAC: Tachycardiac Regular rate. S1. S2. grade 2 syst murmur. No rub. No distant heart sounds.  LUNG/CHEST: Absent breath sounds on right. No wheezes. No rales. No rhonchi.  ABDOMEN: Soft. No tenderness. No distension. No fluid wave. Normal bowel sounds.  BACK: No midline/vertebral tenderness. No flank tenderness.  VASCULAR: +2 b/l radial or ulnar pulses. Palpable DP pulses.  EXTREMITIES:  No clubbing. No cyanosis. Mild +1 LLE/LUE nonpitting edema. Moving all 4.  NEUROLOGY: A&Ox3. Non-focal exam. Cranial nerves intact. Normal speech. Sensation intact.  PSYCH: Normal behavior. Normal affect.  SKIN: No jaundice. No erythema. No rash/lesion.    Care Discussed with Consultants/Other Providers [X ] YES  [ ] NO

## 2018-03-10 NOTE — H&P ADULT - HISTORY OF PRESENT ILLNESS
82 year old female diagnosed with breast cancer (2015), had chemotherapy s/p left modified radical mastectomy. 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    Pt states that she has been feeling weak for the past 1 month. She has been taking xeloda oral chemo at home every other week. Otherwise states she was in usual state of health until about 2 days ago, when she has started to feel more SOB. Initially, the SOB and tiredness with palpitations occurred only on ambulating, but states that it has progressed so that she feels a little SOB at rest as well. She feels better when she's lying down. Denies calf tenderness. Reports mild CP that radiates up from her epigastrium. Denies fevers, chills, diarrhea. Reports constipation, mild nausea and one episode of vomiting. Denies bleeding from any source, and reports brown stool.    Review of CT imaging shows no pleural effusion in June '17, but new moderate right pleural effusion in Nov '17.    VS: Tm 98.1, P 104, /93, R 21, 89% RA  In the ED, received lovenox 70, .

## 2018-03-10 NOTE — H&P ADULT - PROBLEM SELECTOR PLAN 1
- likely 2/2 large pleural effusion  - no productive cough or other sx to suspect airway obstruction

## 2018-03-10 NOTE — H&P ADULT - PROBLEM SELECTOR PLAN 3
- 2/2 collapsed lung and pe  - cont treatment for both as above  - cont O2 by NC, cpap/bilevel if necessary - will get duplex of LE  - pt has chronic LUE lymphedema  - will get TTE  - cont lovenox 70 BID  - hold BP meds  - hemodynamically stable, does not require urgent TPA or thrombectomy at this time

## 2018-03-10 NOTE — PROGRESS NOTE ADULT - SUBJECTIVE AND OBJECTIVE BOX
Patient is a 82y old  Female who presents with a chief complaint of SOB, tiredness (10 Mar 2018 00:04)        SUBJECTIVE / OVERNIGHT EVENTS: Overnight, pt was clinically stable however continues to endorse generalized weakness and dyspnea.  Dyspnea is decreased in severity as compared to yesterday. Pt denies fever, chills, cough, hemoptysis, chest pain and abdominal pain.      MEDICATIONS  (STANDING):  amLODIPine   Tablet 10 milliGRAM(s) Oral daily  docusate sodium 100 milliGRAM(s) Oral three times a day  gabapentin 300 milliGRAM(s) Oral two times a day  influenza   Vaccine 0.5 milliLiter(s) IntraMuscular once  labetalol 100 milliGRAM(s) Oral two times a day    MEDICATIONS  (PRN):  oxyCODONE    5 mG/acetaminophen 325 mG 1 Tablet(s) Oral every 6 hours PRN Moderate Pain (4 - 6)  polyethylene glycol 3350 17 Gram(s) Oral two times a day PRN Constipation  senna 2 Tablet(s) Oral at bedtime PRN Constipation      Vital Signs Last 24 Hrs  T(C): 36.7 (10 Mar 2018 08:45), Max: 36.9 (10 Mar 2018 00:00)  T(F): 98.1 (10 Mar 2018 08:45), Max: 98.5 (10 Mar 2018 00:00)  HR: 89 (10 Mar 2018 08:45) (89 - 104)  BP: 143/76 (10 Mar 2018 08:45) (143/76 - 179/93)  BP(mean): --  RR: 18 (10 Mar 2018 08:45) (18 - 21)  SpO2: 96% (10 Mar 2018 08:45) (89% - 98%)  CAPILLARY BLOOD GLUCOSE        I&O's Summary    09 Mar 2018 07:01  -  10 Mar 2018 07:00  --------------------------------------------------------  IN: 120 mL / OUT: 0 mL / NET: 120 mL    10 Mar 2018 06:01  -  10 Mar 2018 09:52  --------------------------------------------------------  IN: 240 mL / OUT: 0 mL / NET: 240 mL          PHYSICAL EXAM  GENERAL: NAD, cachetic  HEAD:  Atraumatic, Normocephalic  EYES: EOMI, PERRLA, conjunctiva and sclera clear  NECK: Supple, No JVD  CHEST/LUNG: absent breath sounds throughout R lung; No wheeze, rales, rhonchi  HEART: systolic murmur at Regular rate and rhythm; nl S1, S2, rubs, or gallops  ABDOMEN: Soft, Nontender, Nondistended; Bowel sounds present  EXTREMITIES:  1+ nonpitting edema in LLE; 2+ Peripheral Pulses, No clubbing, cyanosis,  PSYCH: AAOx3  SKIN: No rashes or lesions    LABS:                        12.2   8.4   )-----------( 308      ( 09 Mar 2018 16:50 )             36.9     03-09    135  |  95<L>  |  13  ----------------------------<  174<H>  3.9   |  25  |  1.17    Ca    9.8      09 Mar 2018 16:50    TPro  8.7<H>  /  Alb  3.9  /  TBili  0.5  /  DBili  x   /  AST  24  /  ALT  12  /  AlkPhos  81  03-09    PT/INR - ( 09 Mar 2018 16:50 )   PT: 12.7 sec;   INR: 1.17 ratio         PTT - ( 09 Mar 2018 16:50 )  PTT:30.7 sec  CARDIAC MARKERS ( 09 Mar 2018 16:50 )  x     / <0.01 ng/mL / x     / x     / x              RADIOLOGY & ADDITIONAL TESTS:    Imaging Personally Reviewed:  Consultant(s) Notes Reviewed:    Care Discussed with Consultants/Other Providers:

## 2018-03-10 NOTE — H&P ADULT - NSHPSOCIALHISTORY_GEN_ALL_CORE
Social History:    Marital Status: (  ) , (  ) Single, (  ) , ( x ) , (  )   # of Children: 3  Lives with: (  ) alone, ( x ) children, (  ) spouse, (  ) parents, (  ) siblings, (  ) friends, (  ) other:   Occupation: retired nurse    Substance Use/Illicit Drugs: (  ) never used, (  ) other:   Tobacco Usage: (  ) never smoked, ( x ) former smoker, (  ) current smoker, and Total Pack-Years: 40 years  Last Alcohol Usage/Frequency/Amount:

## 2018-03-10 NOTE — PROGRESS NOTE ADULT - PROBLEM SELECTOR PLAN 2
Large right pleural effusion on the right most likely 2/2 to malignancy.  Pulm evaluated pt at bedside  - less likely CHF given only hx of grade 1 DHF  - therapeutic thoracentesis planned for 3/11; hold lovenox until procedure is completed  - monitor on tele, continuous pulse ox overnight  - hemodynamically stable at this time  - no hx of pericardial effusion

## 2018-03-10 NOTE — CONSULT NOTE ADULT - PROBLEM SELECTOR RECOMMENDATION 2
s/p lovenox in ER.   Agree with holding lovenox in setting of procedure tomorrow AM, but should be restarted directly after procedure.   Will likely require anticoagulation indefinitely due to malignancy at this point.

## 2018-03-10 NOTE — PROGRESS NOTE ADULT - PROBLEM SELECTOR PLAN 3
- will get duplex of LE  - pt has chronic LUE lymphedema  - f/u TTE results  - hold therapeutic anticoagulation until the thoracentesis is completed  - hold BP meds  - hemodynamically stable, does not require urgent TPA or thrombectomy at this time

## 2018-03-10 NOTE — PROGRESS NOTE ADULT - PROBLEM SELECTOR PLAN 2
Large right pleural effusion on the right most likely 2/2 to malignancy.  Pulm evaluated pt at bedside  - less likely CHF given only hx of grade 1 DHF  - therapeutic thoracentesis planned for 3/11; hold lovenox until procedure is completed  - monitor on tele, continuous pulse ox overnight  - hemodynamically stable at this time  - no hx of pericardial effusion Large right pleural effusion on the right most likely 2/2 to malignancy.  Pulm evaluated pt at bedside  - less likely CHF given only hx of grade 1 DHF, unchanged on TTE today  - therapeutic thoracentesis planned for 3/11; hold lovenox until procedure is completed  - monitor on tele, continuous pulse ox overnight  - hemodynamically stable at this time  - no hx of pericardial effusion  - Continue NC for oxygenation  - Will obtain fluid studies

## 2018-03-10 NOTE — H&P ADULT - NSHPPHYSICALEXAM_GEN_ALL_CORE
PHYSICAL EXAM:   GENERAL: Alert. Not confused. Mild resp distress. Not thin/cachectic. Not obese.  HEAD:  Atraumatic. Normocephalic.  EYES: EOMI. PERRLA. Normal conjunctiva/sclera.  ENT: Neck supple. No JVD. Moist oral mucosa. Not edentulous. No thrush.  LYMPH: Normal supraclavicular/cervical lymph nodes.   CARDIAC: Tachycardiac Regular rate. S1. S2. grade 2 syst murmur. No rub. No distant heart sounds.  LUNG/CHEST: Absent breath sounds on right. No wheezes. No rales. No rhonchi.  ABDOMEN: Soft. No tenderness. No distension. No fluid wave. Normal bowel sounds.  BACK: No midline/vertebral tenderness. No flank tenderness.  VASCULAR: +2 b/l radial or ulnar pulses. Palpable DP pulses.  EXTREMITIES:  No clubbing. No cyanosis. Mild +1 LLE/LUE nonpitting edema. Moving all 4.  NEUROLOGY: A&Ox3. Non-focal exam. Cranial nerves intact. Normal speech. Sensation intact.  PSYCH: Normal behavior. Normal affect.  SKIN: No jaundice. No erythema. No rash/lesion.  Vascular Access:     ICU Vital Signs Last 24 Hrs  T(C): 36.9 (10 Mar 2018 00:00), Max: 36.9 (10 Mar 2018 00:00)  T(F): 98.5 (10 Mar 2018 00:00), Max: 98.5 (10 Mar 2018 00:00)  HR: 100 (10 Mar 2018 00:00) (94 - 104)  BP: 160/108 (10 Mar 2018 00:00) (156/92 - 179/93)  BP(mean): --  ABP: --  ABP(mean): --  RR: 19 (10 Mar 2018 00:00) (18 - 21)  SpO2: 94% (10 Mar 2018 00:00) (89% - 98%)      I&O's Summary

## 2018-03-10 NOTE — H&P ADULT - PROBLEM SELECTOR PLAN 7
- pt wishes all 3 of her children to make decisions for her if she is unable to  - pt wishes for full code  - time spent 20 min

## 2018-03-10 NOTE — PROGRESS NOTE ADULT - PROBLEM SELECTOR PLAN 1
Complete collapse on R lung seen on CTA chest is likely 2/2 large pleural effusion  - no productive cough or other sx to suspect airway obstruction  - therapeutic thoracentesis is planned for 3/11 am

## 2018-03-10 NOTE — CONSULT NOTE ADULT - PROBLEM SELECTOR RECOMMENDATION 3
Currently on Xeloda one week on, one week off.   Holding Xeloda in the inpatient setting.   Will need dedicated imaging to evaluate for disease response.

## 2018-03-10 NOTE — PROGRESS NOTE ADULT - PROBLEM SELECTOR PLAN 1
Complete collapse on R lung seen on CTA chest is likely 2/2 large pleural effusion  - no productive cough or other sx to suspect airway obstruction  - therapeutic thoracentesis is planned for 3/11 am Complete collapse of R lung seen on CTA chest is likely 2/2 large pleural effusion  - no productive cough or other sx to suspect airway obstruction  - therapeutic thoracentesis is planned for 3/11 in the am  - unable to perform emergent thoracocentesis 2/2 lovenox dosing

## 2018-03-10 NOTE — CONSULT NOTE ADULT - ASSESSMENT
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   Requiring O2 but not dyspneic at rest    Recs: Will perform therapeutic thora tomorrow AM  Hold lovenox  May need tunneled Pleural cathter eventually    Dw Dr Fatima

## 2018-03-10 NOTE — CONSULT NOTE ADULT - ATTENDING COMMENTS
Pt with triple negative metastatic breast cancer who was on oral capecitabine. With new skin nodules over the left chest wall and worsening pleural effusion; progression from capecitabine. She will have therapeutic thoracentesis tomorrow. Reviewed with pt anticoagulation options. She does not think she can self administer enoxaparin but her granddaughter may be able to. Reviewed different AC options. Prefer enoxaparin for now. Reviewed with pt further palliative chemotherapy options but will review with her family. She has been hesitant to return to intravenous chemotherapy.

## 2018-03-10 NOTE — H&P ADULT - NSHPREVIEWOFSYSTEMS_GEN_ALL_CORE
REVIEW OF SYSTEMS:  CONSTITUTIONAL: +weakness. No fevers. No chills. No rigors. +weight loss. +poor appetite.  EYES: No visual changes. No eye pain.  ENT: No hearing difficulty. No vertigo. No dysphagia. No Sinusitis/rhinorrhea.  NECK: No pain. No stiffness/rigidity.  CARDIAC: +chest pain. +palpitations.  RESPIRATORY: No cough. +SOB. No hemoptysis.  GASTROINTESTINAL: +abdominal pain. +nausea. +vomiting. No hematemesis. No diarrhea. +constipation. No melena. No hematochezia.  GENITOURINARY: No dysuria. No frequency. No hesitancy. No hematuria.  NEUROLOGICAL: No numbness/tingling. No focal weakness. No incontinence. No headache.  BACK: +back pain.  EXTREMITIES: No lower extremity edema. Full ROM.  SKIN: No rashes. No itching. No other lesions.  ALLERGIC: No lip swelling. No hives.  All other review of systems is negative unless indicated above.  Unless indicated above, unable to assess ROS 2/2

## 2018-03-10 NOTE — PROGRESS NOTE ADULT - PROBLEM SELECTOR PLAN 4
- 2/2 collapsed lung and pe  - cont treatment for both as above  - cont O2 by NC, cpap/bilevel if necessary - 2/2 collapsed lung and pe  - cont treatment for both as above  - cont O2 by NC, escalate to NRB->> High flow if necessary   - Will obtain ABG if patient in respiratory distress

## 2018-03-10 NOTE — PROGRESS NOTE ADULT - PROBLEM SELECTOR PLAN 5
- house onc consult in AM. emailed overnight  - cont xeloda as per outpt schedule  - PT eval - Hold xeloda as per onc recs   - Metastases with associated decline in functional status  - Need further GOC discussion

## 2018-03-10 NOTE — PROGRESS NOTE ADULT - PROBLEM SELECTOR PLAN 3
- will get duplex of LE  - pt has chronic LUE lymphedema  - f/u TTE results  - hold therapeutic anticoagulation until the thoracentesis is completed  - hold BP meds  - hemodynamically stable, does not require urgent TPA or thrombectomy at this time - CTA showing subsegmental PE of LLL  - will get duplex of LE to assess for DVT, to determine need for definitive treatment   - f/u TTE results  - hold therapeutic anticoagulation until the thoracentesis is completed  - hold BP meds  - hemodynamically stable, does not require urgent TPA or thrombectomy at this time

## 2018-03-10 NOTE — H&P ADULT - ASSESSMENT
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

## 2018-03-10 NOTE — H&P ADULT - PROBLEM SELECTOR PLAN 4
- will get duplex of LE  - pt has chronic LUE lymphedema  - will get TTE - 2/2 collapsed lung and pe  - cont treatment for both as above  - cont O2 by NC, cpap/bilevel if necessary

## 2018-03-10 NOTE — PROGRESS NOTE ADULT - PROBLEM SELECTOR PLAN 4
- 2/2 collapsed lung and pe  - cont treatment for both as above  - cont O2 by NC, cpap/bilevel if necessary

## 2018-03-11 ENCOUNTER — RESULT REVIEW (OUTPATIENT)
Age: 83
End: 2018-03-11

## 2018-03-11 DIAGNOSIS — C50.412 MALIGNANT NEOPLASM OF UPPER-OUTER QUADRANT OF LEFT FEMALE BREAST: ICD-10-CM

## 2018-03-11 LAB
ALBUMIN SERPL ELPH-MCNC: 3.4 G/DL — SIGNIFICANT CHANGE UP (ref 3.3–5)
ALP SERPL-CCNC: 77 U/L — SIGNIFICANT CHANGE UP (ref 40–120)
ALT FLD-CCNC: 9 U/L RC — LOW (ref 10–45)
ANION GAP SERPL CALC-SCNC: 11 MMOL/L — SIGNIFICANT CHANGE UP (ref 5–17)
APTT BLD: 31.2 SEC — SIGNIFICANT CHANGE UP (ref 27.5–37.4)
AST SERPL-CCNC: 22 U/L — SIGNIFICANT CHANGE UP (ref 10–40)
B PERT IGG+IGM PNL SER: ABNORMAL
BASOPHILS # BLD AUTO: 0.03 K/UL — SIGNIFICANT CHANGE UP (ref 0–0.2)
BASOPHILS NFR BLD AUTO: 0.4 % — SIGNIFICANT CHANGE UP (ref 0–2)
BILIRUB SERPL-MCNC: 0.5 MG/DL — SIGNIFICANT CHANGE UP (ref 0.2–1.2)
BUN SERPL-MCNC: 12 MG/DL — SIGNIFICANT CHANGE UP (ref 7–23)
CALCIUM SERPL-MCNC: 10 MG/DL — SIGNIFICANT CHANGE UP (ref 8.4–10.5)
CHLORIDE SERPL-SCNC: 99 MMOL/L — SIGNIFICANT CHANGE UP (ref 96–108)
CO2 SERPL-SCNC: 28 MMOL/L — SIGNIFICANT CHANGE UP (ref 22–31)
COLOR FLD: SIGNIFICANT CHANGE UP
CREAT SERPL-MCNC: 1.09 MG/DL — SIGNIFICANT CHANGE UP (ref 0.5–1.3)
EOSINOPHIL # BLD AUTO: 0.17 K/UL — SIGNIFICANT CHANGE UP (ref 0–0.5)
EOSINOPHIL NFR BLD AUTO: 2.3 % — SIGNIFICANT CHANGE UP (ref 0–6)
FLUID INTAKE SUBSTANCE CLASS: SIGNIFICANT CHANGE UP
FLUID SEGMENTED GRANULOCYTES: 8 % — SIGNIFICANT CHANGE UP
GLUCOSE FLD-MCNC: 158 MG/DL — SIGNIFICANT CHANGE UP
GLUCOSE SERPL-MCNC: 140 MG/DL — HIGH (ref 70–99)
GRAM STN FLD: SIGNIFICANT CHANGE UP
HCT VFR BLD CALC: 36.7 % — SIGNIFICANT CHANGE UP (ref 34.5–45)
HGB BLD-MCNC: 11.4 G/DL — LOW (ref 11.5–15.5)
IMM GRANULOCYTES NFR BLD AUTO: 0.3 % — SIGNIFICANT CHANGE UP (ref 0–1.5)
INR BLD: 1.14 RATIO — SIGNIFICANT CHANGE UP (ref 0.88–1.16)
LDH SERPL L TO P-CCNC: 294 U/L — SIGNIFICANT CHANGE UP
LYMPHOCYTES # BLD AUTO: 0.95 K/UL — LOW (ref 1–3.3)
LYMPHOCYTES # BLD AUTO: 13 % — SIGNIFICANT CHANGE UP (ref 13–44)
LYMPHOCYTES # FLD: 17 % — SIGNIFICANT CHANGE UP
MCHC RBC-ENTMCNC: 29.5 PG — SIGNIFICANT CHANGE UP (ref 27–34)
MCHC RBC-ENTMCNC: 31.1 GM/DL — LOW (ref 32–36)
MCV RBC AUTO: 94.8 FL — SIGNIFICANT CHANGE UP (ref 80–100)
MESOTHL CELL # FLD: 1 % — SIGNIFICANT CHANGE UP
MONOCYTES # BLD AUTO: 1.12 K/UL — HIGH (ref 0–0.9)
MONOCYTES NFR BLD AUTO: 15.3 % — HIGH (ref 2–14)
MONOS+MACROS # FLD: 68 % — SIGNIFICANT CHANGE UP
NEUTROPHILS # BLD AUTO: 5.04 K/UL — SIGNIFICANT CHANGE UP (ref 1.8–7.4)
NEUTROPHILS NFR BLD AUTO: 68.7 % — SIGNIFICANT CHANGE UP (ref 43–77)
OTHER CELLS FLD MANUAL: 6 % — SIGNIFICANT CHANGE UP
PH FLD: 7.48 — SIGNIFICANT CHANGE UP
PLATELET # BLD AUTO: 300 K/UL — SIGNIFICANT CHANGE UP (ref 150–400)
POTASSIUM SERPL-MCNC: 3.9 MMOL/L — SIGNIFICANT CHANGE UP (ref 3.5–5.3)
POTASSIUM SERPL-SCNC: 3.9 MMOL/L — SIGNIFICANT CHANGE UP (ref 3.5–5.3)
PROT FLD-MCNC: 5.3 G/DL — SIGNIFICANT CHANGE UP
PROT SERPL-MCNC: 7.4 G/DL — SIGNIFICANT CHANGE UP (ref 6–8.3)
PROTHROM AB SERPL-ACNC: 12.9 SEC — SIGNIFICANT CHANGE UP (ref 10–13.1)
RBC # BLD: 3.87 M/UL — SIGNIFICANT CHANGE UP (ref 3.8–5.2)
RBC # FLD: 18.3 % — HIGH (ref 10.3–14.5)
RCV VOL RI: HIGH /UL (ref 0–5)
SODIUM SERPL-SCNC: 138 MMOL/L — SIGNIFICANT CHANGE UP (ref 135–145)
SPECIMEN SOURCE FLD: SIGNIFICANT CHANGE UP
SPECIMEN SOURCE: SIGNIFICANT CHANGE UP
TOTAL NUCLEATED CELL COUNT, BODY FLUID: 20 /UL — HIGH (ref 0–5)
TUBE TYPE: SIGNIFICANT CHANGE UP
WBC # BLD: 7.33 K/UL — SIGNIFICANT CHANGE UP (ref 3.8–10.5)
WBC # FLD AUTO: 7.33 K/UL — SIGNIFICANT CHANGE UP (ref 3.8–10.5)

## 2018-03-11 PROCEDURE — 99232 SBSQ HOSP IP/OBS MODERATE 35: CPT

## 2018-03-11 PROCEDURE — 71045 X-RAY EXAM CHEST 1 VIEW: CPT | Mod: 26

## 2018-03-11 PROCEDURE — 88360 TUMOR IMMUNOHISTOCHEM/MANUAL: CPT | Mod: 26

## 2018-03-11 PROCEDURE — 88108 CYTOPATH CONCENTRATE TECH: CPT | Mod: 26

## 2018-03-11 PROCEDURE — 99233 SBSQ HOSP IP/OBS HIGH 50: CPT | Mod: GC

## 2018-03-11 RX ORDER — ENOXAPARIN SODIUM 100 MG/ML
70 INJECTION SUBCUTANEOUS
Qty: 0 | Refills: 0 | Status: DISCONTINUED | OUTPATIENT
Start: 2018-03-11 | End: 2018-03-13

## 2018-03-11 RX ADMIN — AMLODIPINE BESYLATE 10 MILLIGRAM(S): 2.5 TABLET ORAL at 06:01

## 2018-03-11 RX ADMIN — Medication 100 MILLIGRAM(S): at 06:01

## 2018-03-11 RX ADMIN — GABAPENTIN 300 MILLIGRAM(S): 400 CAPSULE ORAL at 06:01

## 2018-03-11 RX ADMIN — Medication 100 MILLIGRAM(S): at 17:32

## 2018-03-11 RX ADMIN — Medication 100 MILLIGRAM(S): at 06:00

## 2018-03-11 RX ADMIN — ENOXAPARIN SODIUM 70 MILLIGRAM(S): 100 INJECTION SUBCUTANEOUS at 13:43

## 2018-03-11 RX ADMIN — GABAPENTIN 300 MILLIGRAM(S): 400 CAPSULE ORAL at 17:32

## 2018-03-11 RX ADMIN — POLYETHYLENE GLYCOL 3350 17 GRAM(S): 17 POWDER, FOR SOLUTION ORAL at 11:53

## 2018-03-11 RX ADMIN — ENOXAPARIN SODIUM 70 MILLIGRAM(S): 100 INJECTION SUBCUTANEOUS at 17:32

## 2018-03-11 RX ADMIN — Medication 100 MILLIGRAM(S): at 21:26

## 2018-03-11 RX ADMIN — Medication 1: at 11:53

## 2018-03-11 RX ADMIN — Medication 100 MILLIGRAM(S): at 11:53

## 2018-03-11 NOTE — PHYSICAL THERAPY INITIAL EVALUATION ADULT - ADDITIONAL COMMENTS
Pt lives in a private house w/ 3 steps to enter w/ handrails. PT pt (I) w/ functional mobility & ADL w/o AD

## 2018-03-11 NOTE — PROGRESS NOTE ADULT - SUBJECTIVE AND OBJECTIVE BOX
Oncology Follow-up    INTERVAL HPI/OVERNIGHT EVENTS: Pt tolerated thoracentesis well. She feels tired. Denies any cough.    VITAL SIGNS:  T(F): 98.5 (03-11-18 @ 11:39)  HR: 95 (03-11-18 @ 13:22)  BP: 163/72 (03-11-18 @ 13:22)  RR: 18 (03-11-18 @ 11:39)  SpO2: 94% (03-11-18 @ 13:22)  Wt(kg): --    PHYSICAL EXAM:    Constitutional: AAOx3, NAD,   Eyes: PERRL, EOMI, sclera non-icteric  Neck: supple, no masses, no JVD  Respiratory: decreased BS at bases   Cardiovascular: RRR, normal S1S2, no M/R/G  Gastrointestinal: soft, NTND, no masses palpable, BS normal   Extremities:  no c/c/e  Skin: No rash     MEDICATIONS  (STANDING):  amLODIPine   Tablet 10 milliGRAM(s) Oral daily  dextrose 5%. 1000 milliLiter(s) (50 mL/Hr) IV Continuous <Continuous>  dextrose 50% Injectable 12.5 Gram(s) IV Push once  dextrose 50% Injectable 25 Gram(s) IV Push once  dextrose 50% Injectable 25 Gram(s) IV Push once  docusate sodium 100 milliGRAM(s) Oral three times a day  enoxaparin Injectable 70 milliGRAM(s) SubCutaneous two times a day  gabapentin 300 milliGRAM(s) Oral two times a day  influenza   Vaccine 0.5 milliLiter(s) IntraMuscular once  insulin lispro (HumaLOG) corrective regimen sliding scale   SubCutaneous at bedtime  insulin lispro (HumaLOG) corrective regimen sliding scale   SubCutaneous three times a day before meals  labetalol 100 milliGRAM(s) Oral two times a day    MEDICATIONS  (PRN):  dextrose Gel 1 Dose(s) Oral once PRN Blood Glucose LESS THAN 70 milliGRAM(s)/deciliter  glucagon  Injectable 1 milliGRAM(s) IntraMuscular once PRN Glucose LESS THAN 70 milligrams/deciliter  oxyCODONE    5 mG/acetaminophen 325 mG 1 Tablet(s) Oral every 6 hours PRN Moderate Pain (4 - 6)  polyethylene glycol 3350 17 Gram(s) Oral two times a day PRN Constipation  senna 2 Tablet(s) Oral at bedtime PRN Constipation      dust (Other)  No Known Drug Allergies      LABS:                        11.4   7.33  )-----------( 300      ( 11 Mar 2018 07:30 )             36.7     03-11    138  |  99  |  12  ----------------------------<  140<H>  3.9   |  28  |  1.09    Ca    10.0      11 Mar 2018 05:59    TPro  7.4  /  Alb  3.4  /  TBili  0.5  /  DBili  x   /  AST  22  /  ALT  9<L>  /  AlkPhos  77  03-11    PT/INR - ( 11 Mar 2018 07:30 )   PT: 12.9 sec;   INR: 1.14 ratio         PTT - ( 11 Mar 2018 07:30 )  PTT:31.2 sec       RADIOLOGY & ADDITIONAL TESTS:  Studies reviewed.

## 2018-03-11 NOTE — PROGRESS NOTE ADULT - SUBJECTIVE AND OBJECTIVE BOX
Patient is a 82y old  Female who presents with a chief complaint of SOB, tiredness (10 Mar 2018 00:04)       INTERVAL HPI/OVERNIGHT EVENTS: Had thoracentesis this am with 1.5 L drained. Patient less SOB after thoracentesis.  Now with minimal cough. No pain at thoracentesis site.      MEDICATIONS  (STANDING):  amLODIPine   Tablet 10 milliGRAM(s) Oral daily  dextrose 5%. 1000 milliLiter(s) (50 mL/Hr) IV Continuous <Continuous>  dextrose 50% Injectable 12.5 Gram(s) IV Push once  dextrose 50% Injectable 25 Gram(s) IV Push once  dextrose 50% Injectable 25 Gram(s) IV Push once  docusate sodium 100 milliGRAM(s) Oral three times a day  enoxaparin Injectable 70 milliGRAM(s) SubCutaneous two times a day  gabapentin 300 milliGRAM(s) Oral two times a day  influenza   Vaccine 0.5 milliLiter(s) IntraMuscular once  insulin lispro (HumaLOG) corrective regimen sliding scale   SubCutaneous at bedtime  insulin lispro (HumaLOG) corrective regimen sliding scale   SubCutaneous three times a day before meals  labetalol 100 milliGRAM(s) Oral two times a day    MEDICATIONS  (PRN):  dextrose Gel 1 Dose(s) Oral once PRN Blood Glucose LESS THAN 70 milliGRAM(s)/deciliter  glucagon  Injectable 1 milliGRAM(s) IntraMuscular once PRN Glucose LESS THAN 70 milligrams/deciliter  oxyCODONE    5 mG/acetaminophen 325 mG 1 Tablet(s) Oral every 6 hours PRN Moderate Pain (4 - 6)  polyethylene glycol 3350 17 Gram(s) Oral two times a day PRN Constipation  senna 2 Tablet(s) Oral at bedtime PRN Constipation      Allergies    dust (Other)  No Known Drug Allergies    Intolerances    codeine (Nausea)      REVIEW OF SYSTEMS:  CONSTITUTIONAL: No fever, weight loss, or fatigue  EYES: No eye pain, visual disturbances, or discharge  ENMT:  No difficulty hearing, tinnitus, vertigo; No sinus or throat pain  RESPIRATORY: No cough, wheezing, chills or hemoptysis; No shortness of breath  CARDIOVASCULAR: No chest pain, palpitations, dizziness, or leg swelling  GASTROINTESTINAL: No abdominal or epigastric pain. No nausea, vomiting, or hematemesis; No diarrhea or constipation. No melena or hematochezia.  GENITOURINARY: No dysuria, frequency, hematuria, or incontinence  NEUROLOGICAL: No headaches, loss of strength, numbness, or tremors  SKIN: No itching, burning, rashes, or lesions   LYMPH NODES: No enlarged glands  ENDOCRINE: No heat or cold intolerance; No polydipsia or polyuria  MUSCULOSKELETAL: No joint pain or swelling;   PSYCHIATRIC: Denies depression, anxiety  HEME/LYMPH: No easy bruising, or bleeding gums  ALLERGY AND IMMUNOLOGIC: No hives or eczema    Vital Signs Last 24 Hrs  T(C): 36.9 (11 Mar 2018 11:39), Max: 37 (11 Mar 2018 04:37)  T(F): 98.5 (11 Mar 2018 11:39), Max: 98.6 (11 Mar 2018 04:37)  HR: 95 (11 Mar 2018 13:22) (76 - 97)  BP: 163/72 (11 Mar 2018 13:22) (138/79 - 163/72)  BP(mean): --  RR: 18 (11 Mar 2018 11:39) (18 - 18)  SpO2: 94% (11 Mar 2018 13:22) (92% - 96%)    PHYSICAL EXAM:  GENERAL: NAD, well-groomed, well-developed  HEAD:  Atraumatic, Normocephalic  EYES: EOMI, PERRLA, conjunctiva and sclera clear  ENMT: No tonsillar erythema, exudates, or enlargement; Moist mucous membranes, Good dentition  NECK: Supple, No JVD  NERVOUS SYSTEM: AOX3, motor and sensation grossly intact in b/l UE and b/l LE  CHEST/LUNG: Clear to auscultation bilaterally; No rales, rhonchi, wheezing, or rubs  HEART: Regular rate and rhythm; No murmurs, rubs, or gallops. No LE edema  ABDOMEN: Soft, Nontender, Nondistended; Bowel sounds present  EXTREMITIES:  2+ Peripheral Pulses, No clubbing, cyanosis  LYMPH: No lymphadenopathy noted  SKIN: No rashes or lesions    LABS:                        11.4   7.33  )-----------( 300      ( 11 Mar 2018 07:30 )             36.7     11 Mar 2018 05:59    138    |  99     |  12     ----------------------------<  140    3.9     |  28     |  1.09     Ca    10.0       11 Mar 2018 05:59    TPro  7.4    /  Alb  3.4    /  TBili  0.5    /  DBili  x      /  AST  22     /  ALT  9      /  AlkPhos  77     11 Mar 2018 05:59    PT/INR - ( 11 Mar 2018 07:30 )   PT: 12.9 sec;   INR: 1.14 ratio         PTT - ( 11 Mar 2018 07:30 )  PTT:31.2 sec  CAPILLARY BLOOD GLUCOSE      POCT Blood Glucose.: 196 mg/dL (11 Mar 2018 11:41)  POCT Blood Glucose.: 141 mg/dL (11 Mar 2018 07:52)  POCT Blood Glucose.: 148 mg/dL (10 Mar 2018 21:48)    BLOOD CULTURE    RADIOLOGY & ADDITIONAL TESTS:    Imaging Personally Reviewed:  [ ] YES     Consultant(s) Notes Reviewed:      Care Discussed with Consultants/Other Providers:

## 2018-03-11 NOTE — PROGRESS NOTE ADULT - ASSESSMENT
83 y/o AAF with metastatic triple negative breast cancer s/p thoracentesis for symptomatic right pleural effusion and to resume AC for left PE.

## 2018-03-11 NOTE — PROGRESS NOTE ADULT - PROBLEM SELECTOR PLAN 2
Large right pleural effusion on the right most likely 2/2 to malignancy.  Pulm evaluated pt at bedside  - c/w exudative process - f/u cultures / cytopathology. Likely malignant  - per pulm may require pleurex catheter

## 2018-03-11 NOTE — PROGRESS NOTE ADULT - PROBLEM SELECTOR PLAN 5
- Hold xeloda as per onc recs   - Metastases with associated decline in functional status  - Need further GOC discussion

## 2018-03-11 NOTE — PROGRESS NOTE ADULT - ASSESSMENT
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   Requiring O2 but not dyspneic at rest    Recs: Will perform therapeutic thora today  Ok to restart therapeutic lovenox : It is a subsegmental PE but with ho malignancy we have to RX  May need tunneled Pleural cathter eventually    Dipak Fatima

## 2018-03-11 NOTE — PROGRESS NOTE ADULT - SUBJECTIVE AND OBJECTIVE BOX
CHIEF COMPLAINT:    Interval Events: afebrile    REVIEW OF SYSTEMS:  unchanged from prior note    OBJECTIVE:  ICU Vital Signs Last 24 Hrs  T(C): 37 (11 Mar 2018 04:37), Max: 37 (10 Mar 2018 11:47)  T(F): 98.6 (11 Mar 2018 04:37), Max: 98.6 (10 Mar 2018 11:47)  HR: 97 (11 Mar 2018 05:57) (76 - 97)  BP: 152/84 (11 Mar 2018 05:57) (148/89 - 160/78)  BP(mean): --  ABP: --  ABP(mean): --  RR: 18 (11 Mar 2018 04:37) (18 - 18)  SpO2: 93% (11 Mar 2018 04:37) (93% - 96%)        03-10 @ 06:01  -  03-11 @ 07:00  --------------------------------------------------------  IN: 960 mL / OUT: 1150 mL / NET: -190 mL      CAPILLARY BLOOD GLUCOSE      POCT Blood Glucose.: 141 mg/dL (11 Mar 2018 07:52)      PHYSICAL EXAM:   EOMI. PERRLA. Normal conjunctiva/sclera.  ENT: Neck supple. No JVD. Moist oral mucosa. Not edentulous. No thrush.  LYMPH: Normal supraclavicular/cervical lymph nodes.   CARDIAC: Tachycardiac Regular rate. S1. S2. grade 2 syst murmur. No rub. No distant heart sounds.  LUNG/CHEST: Absent breath sounds on right. No wheezes. No rales. No rhonchi.  ABDOMEN: Soft. No tenderness. No distension. No fluid wave. Normal bowel sounds.  BACK: No midline/vertebral tenderness. No flank tenderness.  VASCULAR: +2 b/l radial or ulnar pulses. Palpable DP pulses.  EXTREMITIES:  No clubbing. No cyanosis. Mild +1 LLE/LUE nonpitting edema. Moving all 4.  NEUROLOGY: A&Ox3. Non-focal exam. Cranial nerves intact. Normal speech. Sensation intact.  PSYCH: Normal behavior. Normal affect.  SKIN: No jaundice. No erythema. No rash/lesion.    HOSPITAL MEDICATIONS:  MEDICATIONS  (STANDING):  amLODIPine   Tablet 10 milliGRAM(s) Oral daily  dextrose 5%. 1000 milliLiter(s) (50 mL/Hr) IV Continuous <Continuous>  dextrose 50% Injectable 12.5 Gram(s) IV Push once  dextrose 50% Injectable 25 Gram(s) IV Push once  dextrose 50% Injectable 25 Gram(s) IV Push once  docusate sodium 100 milliGRAM(s) Oral three times a day  gabapentin 300 milliGRAM(s) Oral two times a day  influenza   Vaccine 0.5 milliLiter(s) IntraMuscular once  insulin lispro (HumaLOG) corrective regimen sliding scale   SubCutaneous at bedtime  insulin lispro (HumaLOG) corrective regimen sliding scale   SubCutaneous three times a day before meals  labetalol 100 milliGRAM(s) Oral two times a day    MEDICATIONS  (PRN):  dextrose Gel 1 Dose(s) Oral once PRN Blood Glucose LESS THAN 70 milliGRAM(s)/deciliter  glucagon  Injectable 1 milliGRAM(s) IntraMuscular once PRN Glucose LESS THAN 70 milligrams/deciliter  oxyCODONE    5 mG/acetaminophen 325 mG 1 Tablet(s) Oral every 6 hours PRN Moderate Pain (4 - 6)  polyethylene glycol 3350 17 Gram(s) Oral two times a day PRN Constipation  senna 2 Tablet(s) Oral at bedtime PRN Constipation      LABS:                        11.4   7.33  )-----------( 300      ( 11 Mar 2018 07:30 )             36.7     03-11    138  |  99  |  12  ----------------------------<  140<H>  3.9   |  28  |  1.09    Ca    10.0      11 Mar 2018 05:59    TPro  7.4  /  Alb  3.4  /  TBili  0.5  /  DBili  x   /  AST  22  /  ALT  9<L>  /  AlkPhos  77  03-11    PT/INR - ( 09 Mar 2018 16:50 )   PT: 12.7 sec;   INR: 1.17 ratio         PTT - ( 09 Mar 2018 16:50 )  PTT:30.7 sec      Venous Blood Gas:  03-09 @ 16:50  7.36/50/33/28/53  VBG Lactate: 2.3      MICROBIOLOGY:     RADIOLOGY:  [ ] Reviewed and interpreted by me    PULMONARY FUNCTION TESTS:    EKG:

## 2018-03-11 NOTE — PROGRESS NOTE ADULT - PROBLEM SELECTOR PLAN 2
Reviewed with pt that we will plan on changing chemotherapy: only has been on capecitabine with recurrent metastatic disease after response to ACT. May consider carboplatin single agent versus Doxil outpt. We explained that we can obtain CT abd/ pelvis as outpatient to space out contrast exposure.

## 2018-03-11 NOTE — PROGRESS NOTE ADULT - PROBLEM SELECTOR PLAN 1
Complete collapse of R lung seen on CTA chest is likely 2/2 large pleural effusion  - s/p thoracentesis with improvement, f/u CXR to r/o Pneumothorax however +lung sliding on US after procedure so likely no PTX

## 2018-03-11 NOTE — PHYSICAL THERAPY INITIAL EVALUATION ADULT - GENERAL OBSERVATIONS, REHAB EVAL
Pt rec'ed supine in bed, recent , NAD, agreeable to session, breathing RA, n/c at bedside. Initially SpaO2 87%, did not increase w/ pursed lip breathing. Donned 2LO2 via n/c & SpaO2 increased to 92%.

## 2018-03-11 NOTE — PROCEDURE NOTE - NSPROCDETAILS_GEN_ALL_CORE
location identified, draped/prepped, sterile technique used, needle inserted/introduced/Seldinger technique/connection to syringe/ultrasound assessment of effusion (localization)

## 2018-03-11 NOTE — PROGRESS NOTE ADULT - PROBLEM SELECTOR PLAN 1
Will need to check with insurance to see which anticoagulant with lowest copay: pt lives on limited income. Would still prefer enoxaparin 1.5mg/ kg daily= 100mg for cancer pt but would require granddaughter to be able and willing to administer injection and reasonable copay.

## 2018-03-11 NOTE — PROGRESS NOTE ADULT - PROBLEM SELECTOR PLAN 7
- pt wishes all 3 of her children to make decisions for her if she is unable to  - pt wishes for full code

## 2018-03-11 NOTE — PHYSICAL THERAPY INITIAL EVALUATION ADULT - PRECAUTIONS/LIMITATIONS, REHAB EVAL
Initially, the SOB/tiredness w/ palpitations occurred only on ambulating, but states that it has progressed so that she feels a little SOB at rest as well. She feels better when she's lying down. Denies calf tenderness. Reports mild CP that radiates up from her epigastrium. Denies fevers, chills, diarrhea. Reports constipation,mild nausea & 1 episode of vomiting. Denies bleeding from any source, &reports brown stool Initially, the SOB/tiredness w/ palpitations occurred only on ambulating, but states that it has progressed so that she feels a little SOB at rest as well. She feels better when she's lying down. Denies calf tenderness. Reports mild CP that radiates up from her epigastrium. Denies fevers, chills, diarrhea. Reports constipation,mild nausea & 1 episode of vomiting. Denies bleeding from any source, &reports brown stool. CT Angio chest 3/9: Focal subsegmental L lower lobe pulmonary embolism.  Limited evaluation of the R segmental branches due to pulmonary collapse. Large R pleural effusion w/ complete collapse of the R lung no known precautions/limitations/Initially, the SOB/tiredness w/ palpitations occurred only on ambulating, but states that it has progressed so that she feels a little SOB at rest as well. She feels better when she's lying down. Denies calf tenderness. Reports mild CP that radiates up from her epigastrium. Denies fevers, chills, diarrhea. Reports constipation,mild nausea & 1 episode of vomiting. Denies bleeding from any source, &reports brown stool. CT Angio chest 3/9: Focal subsegmental L lower lobe pulmonary embolism.  Limited evaluation of the R segmental branches due to pulmonary collapse. Large R pleural effusion w/ complete collapse of the R lung

## 2018-03-11 NOTE — PHYSICAL THERAPY INITIAL EVALUATION ADULT - PERTINENT HX OF CURRENT PROBLEM, REHAB EVAL
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

## 2018-03-11 NOTE — PROGRESS NOTE ADULT - PROBLEM SELECTOR PLAN 4
- 2/2 collapsed lung and pe  - cont treatment for both as above  - cont O2 by NC, escalate to NRB->> High flow if necessary

## 2018-03-12 LAB
ALBUMIN SERPL ELPH-MCNC: 3.3 G/DL — SIGNIFICANT CHANGE UP (ref 3.3–5)
ALP SERPL-CCNC: 83 U/L — SIGNIFICANT CHANGE UP (ref 40–120)
ALT FLD-CCNC: 21 U/L RC — SIGNIFICANT CHANGE UP (ref 10–45)
ANION GAP SERPL CALC-SCNC: 13 MMOL/L — SIGNIFICANT CHANGE UP (ref 5–17)
APTT BLD: 34.6 SEC — SIGNIFICANT CHANGE UP (ref 27.5–37.4)
AST SERPL-CCNC: 40 U/L — SIGNIFICANT CHANGE UP (ref 10–40)
BASOPHILS # BLD AUTO: 0.02 K/UL — SIGNIFICANT CHANGE UP (ref 0–0.2)
BASOPHILS NFR BLD AUTO: 0.3 % — SIGNIFICANT CHANGE UP (ref 0–2)
BILIRUB SERPL-MCNC: 0.6 MG/DL — SIGNIFICANT CHANGE UP (ref 0.2–1.2)
BUN SERPL-MCNC: 15 MG/DL — SIGNIFICANT CHANGE UP (ref 7–23)
CALCIUM SERPL-MCNC: 9.6 MG/DL — SIGNIFICANT CHANGE UP (ref 8.4–10.5)
CHLORIDE SERPL-SCNC: 96 MMOL/L — SIGNIFICANT CHANGE UP (ref 96–108)
CO2 SERPL-SCNC: 27 MMOL/L — SIGNIFICANT CHANGE UP (ref 22–31)
COMMENT - FLUIDS: SIGNIFICANT CHANGE UP
CREAT SERPL-MCNC: 1.29 MG/DL — SIGNIFICANT CHANGE UP (ref 0.5–1.3)
EOSINOPHIL # BLD AUTO: 0.15 K/UL — SIGNIFICANT CHANGE UP (ref 0–0.5)
EOSINOPHIL NFR BLD AUTO: 2.3 % — SIGNIFICANT CHANGE UP (ref 0–6)
GLUCOSE SERPL-MCNC: 124 MG/DL — HIGH (ref 70–99)
HCT VFR BLD CALC: 34.5 % — SIGNIFICANT CHANGE UP (ref 34.5–45)
HGB BLD-MCNC: 10.9 G/DL — LOW (ref 11.5–15.5)
IMM GRANULOCYTES NFR BLD AUTO: 0.2 % — SIGNIFICANT CHANGE UP (ref 0–1.5)
LYMPHOCYTES # BLD AUTO: 0.9 K/UL — LOW (ref 1–3.3)
LYMPHOCYTES # BLD AUTO: 13.8 % — SIGNIFICANT CHANGE UP (ref 13–44)
MAGNESIUM SERPL-MCNC: 1.8 MG/DL — SIGNIFICANT CHANGE UP (ref 1.6–2.6)
MCHC RBC-ENTMCNC: 30.3 PG — SIGNIFICANT CHANGE UP (ref 27–34)
MCHC RBC-ENTMCNC: 31.6 GM/DL — LOW (ref 32–36)
MCV RBC AUTO: 95.8 FL — SIGNIFICANT CHANGE UP (ref 80–100)
MONOCYTES # BLD AUTO: 0.97 K/UL — HIGH (ref 0–0.9)
MONOCYTES NFR BLD AUTO: 14.9 % — HIGH (ref 2–14)
NEUTROPHILS # BLD AUTO: 4.45 K/UL — SIGNIFICANT CHANGE UP (ref 1.8–7.4)
NEUTROPHILS NFR BLD AUTO: 68.5 % — SIGNIFICANT CHANGE UP (ref 43–77)
NIGHT BLUE STAIN TISS: SIGNIFICANT CHANGE UP
PHOSPHATE SERPL-MCNC: 3.5 MG/DL — SIGNIFICANT CHANGE UP (ref 2.5–4.5)
PLATELET # BLD AUTO: 287 K/UL — SIGNIFICANT CHANGE UP (ref 150–400)
POTASSIUM SERPL-MCNC: 3.7 MMOL/L — SIGNIFICANT CHANGE UP (ref 3.5–5.3)
POTASSIUM SERPL-SCNC: 3.7 MMOL/L — SIGNIFICANT CHANGE UP (ref 3.5–5.3)
PROT SERPL-MCNC: 7.1 G/DL — SIGNIFICANT CHANGE UP (ref 6–8.3)
RBC # BLD: 3.6 M/UL — LOW (ref 3.8–5.2)
RBC # FLD: 17.9 % — HIGH (ref 10.3–14.5)
SODIUM SERPL-SCNC: 136 MMOL/L — SIGNIFICANT CHANGE UP (ref 135–145)
SPECIMEN SOURCE: SIGNIFICANT CHANGE UP
WBC # BLD: 6.5 K/UL — SIGNIFICANT CHANGE UP (ref 3.8–10.5)
WBC # FLD AUTO: 6.5 K/UL — SIGNIFICANT CHANGE UP (ref 3.8–10.5)

## 2018-03-12 PROCEDURE — 99233 SBSQ HOSP IP/OBS HIGH 50: CPT | Mod: GC

## 2018-03-12 PROCEDURE — 99232 SBSQ HOSP IP/OBS MODERATE 35: CPT

## 2018-03-12 PROCEDURE — 71046 X-RAY EXAM CHEST 2 VIEWS: CPT | Mod: 26

## 2018-03-12 RX ADMIN — AMLODIPINE BESYLATE 10 MILLIGRAM(S): 2.5 TABLET ORAL at 05:03

## 2018-03-12 RX ADMIN — Medication 100 MILLIGRAM(S): at 14:50

## 2018-03-12 RX ADMIN — Medication 100 MILLIGRAM(S): at 05:03

## 2018-03-12 RX ADMIN — ENOXAPARIN SODIUM 70 MILLIGRAM(S): 100 INJECTION SUBCUTANEOUS at 05:03

## 2018-03-12 RX ADMIN — Medication 100 MILLIGRAM(S): at 17:44

## 2018-03-12 RX ADMIN — Medication 1: at 08:48

## 2018-03-12 RX ADMIN — Medication 1: at 12:49

## 2018-03-12 RX ADMIN — ENOXAPARIN SODIUM 70 MILLIGRAM(S): 100 INJECTION SUBCUTANEOUS at 17:44

## 2018-03-12 RX ADMIN — POLYETHYLENE GLYCOL 3350 17 GRAM(S): 17 POWDER, FOR SOLUTION ORAL at 14:50

## 2018-03-12 RX ADMIN — GABAPENTIN 300 MILLIGRAM(S): 400 CAPSULE ORAL at 05:03

## 2018-03-12 RX ADMIN — GABAPENTIN 300 MILLIGRAM(S): 400 CAPSULE ORAL at 17:44

## 2018-03-12 RX ADMIN — Medication 100 MILLIGRAM(S): at 22:24

## 2018-03-12 NOTE — PROGRESS NOTE ADULT - ASSESSMENT
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

## 2018-03-12 NOTE — PROGRESS NOTE ADULT - PROBLEM SELECTOR PLAN 2
- CTA showing subsegmental PE of LLL  - No RHS on CTA or TTE  - hemodynamically stable  - Continue therapeutic Lovenox, will hold for Pleur-ex insertion

## 2018-03-12 NOTE — PROGRESS NOTE ADULT - SUBJECTIVE AND OBJECTIVE BOX
Chief Complaint: breast ca    INTERVAL HPI/OVERNIGHT EVENTS: Still with dyspnea on exertion. Pulm planning a pleurix.     MEDICATIONS  (STANDING):  amLODIPine   Tablet 10 milliGRAM(s) Oral daily  dextrose 5%. 1000 milliLiter(s) (50 mL/Hr) IV Continuous <Continuous>  dextrose 50% Injectable 12.5 Gram(s) IV Push once  dextrose 50% Injectable 25 Gram(s) IV Push once  dextrose 50% Injectable 25 Gram(s) IV Push once  docusate sodium 100 milliGRAM(s) Oral three times a day  enoxaparin Injectable 70 milliGRAM(s) SubCutaneous two times a day  gabapentin 300 milliGRAM(s) Oral two times a day  influenza   Vaccine 0.5 milliLiter(s) IntraMuscular once  insulin lispro (HumaLOG) corrective regimen sliding scale   SubCutaneous at bedtime  insulin lispro (HumaLOG) corrective regimen sliding scale   SubCutaneous three times a day before meals  labetalol 100 milliGRAM(s) Oral two times a day    MEDICATIONS  (PRN):  dextrose Gel 1 Dose(s) Oral once PRN Blood Glucose LESS THAN 70 milliGRAM(s)/deciliter  glucagon  Injectable 1 milliGRAM(s) IntraMuscular once PRN Glucose LESS THAN 70 milligrams/deciliter  oxyCODONE    5 mG/acetaminophen 325 mG 1 Tablet(s) Oral every 6 hours PRN Moderate Pain (4 - 6)  polyethylene glycol 3350 17 Gram(s) Oral two times a day PRN Constipation  senna 2 Tablet(s) Oral at bedtime PRN Constipation      Allergies    dust (Other)  No Known Drug Allergies    Intolerances    codeine (Nausea)      ROS: as above     Vital Signs Last 24 Hrs  T(C): 37.2 (12 Mar 2018 21:34), Max: 37.2 (12 Mar 2018 21:34)  T(F): 99 (12 Mar 2018 21:34), Max: 99 (12 Mar 2018 21:34)  HR: 84 (12 Mar 2018 21:34) (76 - 101)  BP: 124/70 (12 Mar 2018 21:34) (120/76 - 148/72)  BP(mean): --  RR: 18 (12 Mar 2018 21:34) (18 - 20)  SpO2: 92% (12 Mar 2018 21:34) (90% - 97%)    Physical Exam:   AAO x 3, NAD   RRR S1S2  decr BS b/l at bases   soft NTNDBS+  no c/c/e    LABS:                        10.9   6.50  )-----------( 287      ( 12 Mar 2018 07:21 )             34.5     03-12    136  |  96  |  15  ----------------------------<  124<H>  3.7   |  27  |  1.29    Ca    9.6      12 Mar 2018 06:04  Phos  3.5     03-12  Mg     1.8     03-12    TPro  7.1  /  Alb  3.3  /  TBili  0.6  /  DBili  x   /  AST  40  /  ALT  21  /  AlkPhos  83  03-12    PT/INR - ( 11 Mar 2018 07:30 )   PT: 12.9 sec;   INR: 1.14 ratio         PTT - ( 12 Mar 2018 09:10 )  PTT:34.6 sec Chief Complaint: breast ca    INTERVAL HPI/OVERNIGHT EVENTS: Still with dyspnea on exertion. Pulm planning a pleurix on wed or thurs.     MEDICATIONS  (STANDING):  amLODIPine   Tablet 10 milliGRAM(s) Oral daily  dextrose 5%. 1000 milliLiter(s) (50 mL/Hr) IV Continuous <Continuous>  dextrose 50% Injectable 12.5 Gram(s) IV Push once  dextrose 50% Injectable 25 Gram(s) IV Push once  dextrose 50% Injectable 25 Gram(s) IV Push once  docusate sodium 100 milliGRAM(s) Oral three times a day  enoxaparin Injectable 70 milliGRAM(s) SubCutaneous two times a day  gabapentin 300 milliGRAM(s) Oral two times a day  influenza   Vaccine 0.5 milliLiter(s) IntraMuscular once  insulin lispro (HumaLOG) corrective regimen sliding scale   SubCutaneous at bedtime  insulin lispro (HumaLOG) corrective regimen sliding scale   SubCutaneous three times a day before meals  labetalol 100 milliGRAM(s) Oral two times a day    MEDICATIONS  (PRN):  dextrose Gel 1 Dose(s) Oral once PRN Blood Glucose LESS THAN 70 milliGRAM(s)/deciliter  glucagon  Injectable 1 milliGRAM(s) IntraMuscular once PRN Glucose LESS THAN 70 milligrams/deciliter  oxyCODONE    5 mG/acetaminophen 325 mG 1 Tablet(s) Oral every 6 hours PRN Moderate Pain (4 - 6)  polyethylene glycol 3350 17 Gram(s) Oral two times a day PRN Constipation  senna 2 Tablet(s) Oral at bedtime PRN Constipation      Allergies    dust (Other)  No Known Drug Allergies    Intolerances    codeine (Nausea)      ROS: as above     Vital Signs Last 24 Hrs  T(C): 37.2 (12 Mar 2018 21:34), Max: 37.2 (12 Mar 2018 21:34)  T(F): 99 (12 Mar 2018 21:34), Max: 99 (12 Mar 2018 21:34)  HR: 84 (12 Mar 2018 21:34) (76 - 101)  BP: 124/70 (12 Mar 2018 21:34) (120/76 - 148/72)  BP(mean): --  RR: 18 (12 Mar 2018 21:34) (18 - 20)  SpO2: 92% (12 Mar 2018 21:34) (90% - 97%)    Physical Exam:   AAO x 3, NAD   RRR S1S2  decr BS b/l at bases   soft NTNDBS+  no c/c/e    LABS:                        10.9   6.50  )-----------( 287      ( 12 Mar 2018 07:21 )             34.5     03-12    136  |  96  |  15  ----------------------------<  124<H>  3.7   |  27  |  1.29    Ca    9.6      12 Mar 2018 06:04  Phos  3.5     03-12  Mg     1.8     03-12    TPro  7.1  /  Alb  3.3  /  TBili  0.6  /  DBili  x   /  AST  40  /  ALT  21  /  AlkPhos  83  03-12    PT/INR - ( 11 Mar 2018 07:30 )   PT: 12.9 sec;   INR: 1.14 ratio         PTT - ( 12 Mar 2018 09:10 )  PTT:34.6 sec

## 2018-03-12 NOTE — PROGRESS NOTE ADULT - PROBLEM SELECTOR PLAN 3
Complete collapse of R lung seen on CTA chest 2/2 large pleural effusion  - no productive cough or other sx to suspect airway obstruction  - Lung now re-expanded, no Pneumothorax, No concern for trap lung

## 2018-03-12 NOTE — PROGRESS NOTE ADULT - PROBLEM SELECTOR PLAN 4
- 2/2 collapsed lung and pe\  - Now resolved  - cont treatment for both as above  - cont O2 by NC, escalate to NRB->> High flow if necessary, but will attempt to wean off 02   - Will ambulate off 02 to assess for home 02 requirement

## 2018-03-12 NOTE — PROGRESS NOTE ADULT - ASSESSMENT
82 F w/ metastatic breast cancer 82 F w/ metastatic breast cancer on Xeloda a/w malignant pleural effusion 2/2 POD, found to have acute PE s/p thoracentesis.

## 2018-03-12 NOTE — PROGRESS NOTE ADULT - PROBLEM SELECTOR PLAN 3
Will need at least 6-1 yr of AC with Lovenox 1.5 mg/kg daily (if pt's son can inject)  Echo was already performed

## 2018-03-12 NOTE — PROGRESS NOTE ADULT - ASSESSMENT
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  s/p thoracentesis 1.5 L yesterday. the fluid is exudative, but not lymph predominance.     On bedside sono, she still has significant R pleural effusion with sedimentation. She would benefit from pleurX placement to help with dyspnea on exertion and optimize for possible change in chemo.     - will discussed further about PleurX placement ancelmo with the patient. talked about it briefly today.    - tentatively plan for placement on Wednesday.   - please holding Lovenox after ancelmo's AM dose.   - agree that she should get long term AC given her malignancy.     Chase Mclain MD   Pulmonary Fellow.

## 2018-03-12 NOTE — PROGRESS NOTE ADULT - SUBJECTIVE AND OBJECTIVE BOX
Patient is a 82y old  Female who presents with a chief complaint of SOB, tiredness (10 Mar 2018 00:04)      INTERVAL HPI/OVERNIGHT EVENTS:  T(C): 36.6 (03-12-18 @ 11:19), Max: 36.9 (03-12-18 @ 04:34)  HR: 76 (03-12-18 @ 11:19) (75 - 90)  BP: 136/79 (03-12-18 @ 11:19) (120/76 - 148/72)  RR: 18 (03-12-18 @ 11:19) (18 - 18)  SpO2: 97% (03-12-18 @ 11:19) (90% - 97%)  Wt(kg): --  I&O's Summary    11 Mar 2018 07:01  -  12 Mar 2018 07:00  --------------------------------------------------------  IN: 1280 mL / OUT: 1200 mL / NET: 80 mL        LABS:                        10.9   6.50  )-----------( 287      ( 12 Mar 2018 07:21 )             34.5     03-12    136  |  96  |  15  ----------------------------<  124<H>  3.7   |  27  |  1.29    Ca    9.6      12 Mar 2018 06:04  Phos  3.5     03-12  Mg     1.8     03-12    TPro  7.1  /  Alb  3.3  /  TBili  0.6  /  DBili  x   /  AST  40  /  ALT  21  /  AlkPhos  83  03-12    PT/INR - ( 11 Mar 2018 07:30 )   PT: 12.9 sec;   INR: 1.14 ratio         PTT - ( 12 Mar 2018 09:10 )  PTT:34.6 sec    CAPILLARY BLOOD GLUCOSE      POCT Blood Glucose.: 152 mg/dL (12 Mar 2018 12:48)  POCT Blood Glucose.: 172 mg/dL (12 Mar 2018 08:47)  POCT Blood Glucose.: 166 mg/dL (12 Mar 2018 07:21)  POCT Blood Glucose.: 154 mg/dL (11 Mar 2018 21:27)  POCT Blood Glucose.: 116 mg/dL (11 Mar 2018 16:37)          REVIEW OF SYSTEMS:  CONSTITUTIONAL: + weakness, no fevers or chills  EYES/ENT: No visual changes;  No vertigo or throat pain   NECK: No pain or stiffness  RESPIRATORY: dyspnea significantly improved, No cough, no wheezing  CARDIOVASCULAR: No chest pain, no palpitations  GASTROINTESTINAL: No abdominal or epigastric pain. No nausea, vomiting, or hematemesis; No diarrhea or constipation. No melena or hematochezia.  GENITOURINARY: No dysuria, frequency or hematuria  NEUROLOGICAL: No numbness, no paresthesias, no headache    RADIOLOGY & ADDITIONAL TESTS:    Imaging Personally Reviewed:  [X ] YES  [ ] NO  R. sided moderate pleural effusion; lung re-expanded     PHYSICAL EXAM:  GENERAL: Frail; NAD  HEAD:  Atraumatic, Normocephalic  EOMI. PERRLA. Normal conjunctiva/sclera.  ENT: Neck supple. No JVD. Moist oral mucosa. Not edentulous. No thrush.  LYMPH: Normal supraclavicular/cervical lymph nodes.   CARDIAC: RRR. S1. S2. grade 2 syst murmur. No rub.   LUNG/CHEST: Diminished lung sounds at right lung base. No wheezes. No rales. No rhonchi.  ABDOMEN: Soft. No tenderness. No distension. No fluid wave. Normal bowel sounds.    Care Discussed with Consultants/Other Providers [ ] YES  [ ] NO Patient is a 82y old  Female who presents with a chief complaint of SOB, tiredness (10 Mar 2018 00:04)      INTERVAL HPI/OVERNIGHT EVENTS: Pt reports improved SOB, no fevers/chills, no aches/pains.   T(C): 36.6 (03-12-18 @ 11:19), Max: 36.9 (03-12-18 @ 04:34)  HR: 76 (03-12-18 @ 11:19) (75 - 90)  BP: 136/79 (03-12-18 @ 11:19) (120/76 - 148/72)  RR: 18 (03-12-18 @ 11:19) (18 - 18)  SpO2: 97% (03-12-18 @ 11:19) (90% - 97%)  Wt(kg): --  I&O's Summary    11 Mar 2018 07:01  -  12 Mar 2018 07:00  --------------------------------------------------------  IN: 1280 mL / OUT: 1200 mL / NET: 80 mL        LABS:                        10.9   6.50  )-----------( 287      ( 12 Mar 2018 07:21 )             34.5     03-12    136  |  96  |  15  ----------------------------<  124<H>  3.7   |  27  |  1.29    Ca    9.6      12 Mar 2018 06:04  Phos  3.5     03-12  Mg     1.8     03-12    TPro  7.1  /  Alb  3.3  /  TBili  0.6  /  DBili  x   /  AST  40  /  ALT  21  /  AlkPhos  83  03-12    PT/INR - ( 11 Mar 2018 07:30 )   PT: 12.9 sec;   INR: 1.14 ratio         PTT - ( 12 Mar 2018 09:10 )  PTT:34.6 sec    CAPILLARY BLOOD GLUCOSE      POCT Blood Glucose.: 152 mg/dL (12 Mar 2018 12:48)  POCT Blood Glucose.: 172 mg/dL (12 Mar 2018 08:47)  POCT Blood Glucose.: 166 mg/dL (12 Mar 2018 07:21)  POCT Blood Glucose.: 154 mg/dL (11 Mar 2018 21:27)  POCT Blood Glucose.: 116 mg/dL (11 Mar 2018 16:37)          REVIEW OF SYSTEMS:  CONSTITUTIONAL: + weakness, no fevers or chills  EYES/ENT: No visual changes;  No vertigo or throat pain   NECK: No pain or stiffness  RESPIRATORY: dyspnea significantly improved, No cough, no wheezing  CARDIOVASCULAR: No chest pain, no palpitations  GASTROINTESTINAL: No abdominal or epigastric pain. No nausea, vomiting, or hematemesis; No diarrhea or constipation. No melena or hematochezia.  GENITOURINARY: No dysuria, frequency or hematuria  NEUROLOGICAL: No numbness, no paresthesias, no headache    RADIOLOGY & ADDITIONAL TESTS:    Imaging Personally Reviewed:  [X ] YES  [ ] NO  R. sided moderate pleural effusion; lung re-expanded     PHYSICAL EXAM:  GENERAL: Frail; NAD  HEAD:  Atraumatic, Normocephalic  EOMI. PERRLA. Normal conjunctiva/sclera.  ENT: Neck supple. No JVD. Moist oral mucosa. Not edentulous. No thrush.  LYMPH: Normal supraclavicular/cervical lymph nodes.   CARDIAC: RRR. S1. S2. grade 2 syst murmur. No rub.   LUNG/CHEST: Diminished lung sounds at right lung base. No wheezes. No rales. No rhonchi.  ABDOMEN: Soft. No tenderness. No distension. No fluid wave. Normal bowel sounds.    Care Discussed with Consultants/Other Providers [ ] YES  [ ] NO

## 2018-03-12 NOTE — PROGRESS NOTE ADULT - SUBJECTIVE AND OBJECTIVE BOX
CHIEF COMPLAINT:    Interval Events:    REVIEW OF SYSTEMS:  Constitutional:   Eyes:  ENT:  CV:  Resp:  GI:  :  MSK:  Integumentary:  Neurological:  Psychiatric:  Endocrine:  Hematologic/Lymphatic:  Allergic/Immunologic:  [ ] All other systems negative  [ ] Unable to assess ROS because ________    OBJECTIVE:  ICU Vital Signs Last 24 Hrs  T(C): 36.6 (12 Mar 2018 11:19), Max: 36.9 (12 Mar 2018 04:34)  T(F): 97.9 (12 Mar 2018 11:19), Max: 98.4 (12 Mar 2018 04:34)  HR: 101 (12 Mar 2018 15:23) (75 - 101)  BP: 136/79 (12 Mar 2018 11:19) (120/76 - 148/72)  BP(mean): --  ABP: --  ABP(mean): --  RR: 20 (12 Mar 2018 15:23) (18 - 20)  SpO2: 90% (12 Mar 2018 15:23) (90% - 97%)        03-11 @ 07:01 - 03-12 @ 07:00  --------------------------------------------------------  IN: 1280 mL / OUT: 1200 mL / NET: 80 mL    03-12 @ 07:01  - 03-12 @ 16:18  --------------------------------------------------------  IN: 480 mL / OUT: 400 mL / NET: 80 mL      CAPILLARY BLOOD GLUCOSE      POCT Blood Glucose.: 152 mg/dL (12 Mar 2018 12:48)      PHYSICAL EXAM:  General:   HEENT:   Lymph Nodes:  Neck:   Respiratory:   Cardiovascular:   Abdomen:   Extremities:   Skin:   Neurological:  Psychiatry:    HOSPITAL MEDICATIONS:  MEDICATIONS  (STANDING):  amLODIPine   Tablet 10 milliGRAM(s) Oral daily  dextrose 5%. 1000 milliLiter(s) (50 mL/Hr) IV Continuous <Continuous>  dextrose 50% Injectable 12.5 Gram(s) IV Push once  dextrose 50% Injectable 25 Gram(s) IV Push once  dextrose 50% Injectable 25 Gram(s) IV Push once  docusate sodium 100 milliGRAM(s) Oral three times a day  enoxaparin Injectable 70 milliGRAM(s) SubCutaneous two times a day  gabapentin 300 milliGRAM(s) Oral two times a day  influenza   Vaccine 0.5 milliLiter(s) IntraMuscular once  insulin lispro (HumaLOG) corrective regimen sliding scale   SubCutaneous at bedtime  insulin lispro (HumaLOG) corrective regimen sliding scale   SubCutaneous three times a day before meals  labetalol 100 milliGRAM(s) Oral two times a day    MEDICATIONS  (PRN):  dextrose Gel 1 Dose(s) Oral once PRN Blood Glucose LESS THAN 70 milliGRAM(s)/deciliter  glucagon  Injectable 1 milliGRAM(s) IntraMuscular once PRN Glucose LESS THAN 70 milligrams/deciliter  oxyCODONE    5 mG/acetaminophen 325 mG 1 Tablet(s) Oral every 6 hours PRN Moderate Pain (4 - 6)  polyethylene glycol 3350 17 Gram(s) Oral two times a day PRN Constipation  senna 2 Tablet(s) Oral at bedtime PRN Constipation      LABS:                        10.9   6.50  )-----------( 287      ( 12 Mar 2018 07:21 )             34.5     03-12    136  |  96  |  15  ----------------------------<  124<H>  3.7   |  27  |  1.29    Ca    9.6      12 Mar 2018 06:04  Phos  3.5     03-12  Mg     1.8     03-12    TPro  7.1  /  Alb  3.3  /  TBili  0.6  /  DBili  x   /  AST  40  /  ALT  21  /  AlkPhos  83  03-12    PT/INR - ( 11 Mar 2018 07:30 )   PT: 12.9 sec;   INR: 1.14 ratio         PTT - ( 12 Mar 2018 09:10 )  PTT:34.6 sec          MICROBIOLOGY:     RADIOLOGY:  [ ] Reviewed and interpreted by me    PULMONARY FUNCTION TESTS:    EKG: CHIEF COMPLAINT: R pleural effusion; SOB    Interval Events: s/p 1.5 L drained yesterday. she feels better. still with mild dyspnea on exertion.     REVIEW OF SYSTEMS:  Constitutional:   Eyes:  ENT:  CV:  Resp:  GI:  :  MSK:  Integumentary:  Neurological:  Psychiatric:  Endocrine:  Hematologic/Lymphatic:  Allergic/Immunologic:  [ ] All other systems negative  [ ] Unable to assess ROS because ________    OBJECTIVE:  ICU Vital Signs Last 24 Hrs  T(C): 36.6 (12 Mar 2018 11:19), Max: 36.9 (12 Mar 2018 04:34)  T(F): 97.9 (12 Mar 2018 11:19), Max: 98.4 (12 Mar 2018 04:34)  HR: 101 (12 Mar 2018 15:23) (75 - 101)  BP: 136/79 (12 Mar 2018 11:19) (120/76 - 148/72)  BP(mean): --  ABP: --  ABP(mean): --  RR: 20 (12 Mar 2018 15:23) (18 - 20)  SpO2: 90% (12 Mar 2018 15:23) (90% - 97%)        03-11 @ 07:01  -  03-12 @ 07:00  --------------------------------------------------------  IN: 1280 mL / OUT: 1200 mL / NET: 80 mL    03-12 @ 07:01 - 03-12 @ 16:18  --------------------------------------------------------  IN: 480 mL / OUT: 400 mL / NET: 80 mL      CAPILLARY BLOOD GLUCOSE      POCT Blood Glucose.: 152 mg/dL (12 Mar 2018 12:48)      PHYSICAL EXAM:  General: sitting up; pleasant; on RA.   HEENT: clear OP  Lymph Nodes: no signficant cervical LAD.  Respiratory: decrease to no breath sounds on the R side.   Cardiovascular: nl rate and reg rhythm. nl s1, s2. no m/r/g.  Abdomen: soft. nt. nd.   Extremities: no edema.   Skin: no rash; surgical scars from the mastectomy.  Neurological: AOx3    HOSPITAL MEDICATIONS:  MEDICATIONS  (STANDING):  amLODIPine   Tablet 10 milliGRAM(s) Oral daily  dextrose 5%. 1000 milliLiter(s) (50 mL/Hr) IV Continuous <Continuous>  dextrose 50% Injectable 12.5 Gram(s) IV Push once  dextrose 50% Injectable 25 Gram(s) IV Push once  dextrose 50% Injectable 25 Gram(s) IV Push once  docusate sodium 100 milliGRAM(s) Oral three times a day  enoxaparin Injectable 70 milliGRAM(s) SubCutaneous two times a day  gabapentin 300 milliGRAM(s) Oral two times a day  influenza   Vaccine 0.5 milliLiter(s) IntraMuscular once  insulin lispro (HumaLOG) corrective regimen sliding scale   SubCutaneous at bedtime  insulin lispro (HumaLOG) corrective regimen sliding scale   SubCutaneous three times a day before meals  labetalol 100 milliGRAM(s) Oral two times a day    MEDICATIONS  (PRN):  dextrose Gel 1 Dose(s) Oral once PRN Blood Glucose LESS THAN 70 milliGRAM(s)/deciliter  glucagon  Injectable 1 milliGRAM(s) IntraMuscular once PRN Glucose LESS THAN 70 milligrams/deciliter  oxyCODONE    5 mG/acetaminophen 325 mG 1 Tablet(s) Oral every 6 hours PRN Moderate Pain (4 - 6)  polyethylene glycol 3350 17 Gram(s) Oral two times a day PRN Constipation  senna 2 Tablet(s) Oral at bedtime PRN Constipation      LABS:                        10.9   6.50  )-----------( 287      ( 12 Mar 2018 07:21 )             34.5     03-12    136  |  96  |  15  ----------------------------<  124<H>  3.7   |  27  |  1.29    Ca    9.6      12 Mar 2018 06:04  Phos  3.5     03-12  Mg     1.8     03-12    TPro  7.1  /  Alb  3.3  /  TBili  0.6  /  DBili  x   /  AST  40  /  ALT  21  /  AlkPhos  83  03-12    PT/INR - ( 11 Mar 2018 07:30 )   PT: 12.9 sec;   INR: 1.14 ratio         PTT - ( 12 Mar 2018 09:10 )  PTT:34.6 sec    RADIOLOGY:  [x] Reviewed and interpreted by me

## 2018-03-12 NOTE — PROGRESS NOTE ADULT - PROBLEM SELECTOR PLAN 1
Xeloda permanently d/c in setting of progression of disease.   Chemotherapy regimen to be changed as an out pt. No plans for in pt chemotherapy  [place] [facility] [place]

## 2018-03-13 DIAGNOSIS — J90 PLEURAL EFFUSION, NOT ELSEWHERE CLASSIFIED: ICD-10-CM

## 2018-03-13 LAB
APTT BLD: 31.9 SEC — SIGNIFICANT CHANGE UP (ref 27.5–37.4)
HCT VFR BLD CALC: 32.9 % — LOW (ref 34.5–45)
HGB BLD-MCNC: 10.7 G/DL — LOW (ref 11.5–15.5)
MCHC RBC-ENTMCNC: 30.3 PG — SIGNIFICANT CHANGE UP (ref 27–34)
MCHC RBC-ENTMCNC: 32.5 GM/DL — SIGNIFICANT CHANGE UP (ref 32–36)
MCV RBC AUTO: 93.2 FL — SIGNIFICANT CHANGE UP (ref 80–100)
NRBC # BLD: 0 /100 WBCS — SIGNIFICANT CHANGE UP (ref 0–0)
PLATELET # BLD AUTO: 278 K/UL — SIGNIFICANT CHANGE UP (ref 150–400)
RBC # BLD: 3.53 M/UL — LOW (ref 3.8–5.2)
RBC # FLD: 17.9 % — HIGH (ref 10.3–14.5)
WBC # BLD: 7.04 K/UL — SIGNIFICANT CHANGE UP (ref 3.8–10.5)
WBC # FLD AUTO: 7.04 K/UL — SIGNIFICANT CHANGE UP (ref 3.8–10.5)

## 2018-03-13 PROCEDURE — 99233 SBSQ HOSP IP/OBS HIGH 50: CPT | Mod: GC

## 2018-03-13 RX ADMIN — AMLODIPINE BESYLATE 10 MILLIGRAM(S): 2.5 TABLET ORAL at 06:05

## 2018-03-13 RX ADMIN — ENOXAPARIN SODIUM 70 MILLIGRAM(S): 100 INJECTION SUBCUTANEOUS at 17:26

## 2018-03-13 RX ADMIN — Medication 100 MILLIGRAM(S): at 06:05

## 2018-03-13 RX ADMIN — Medication 1: at 09:10

## 2018-03-13 RX ADMIN — ENOXAPARIN SODIUM 70 MILLIGRAM(S): 100 INJECTION SUBCUTANEOUS at 06:06

## 2018-03-13 RX ADMIN — Medication 100 MILLIGRAM(S): at 21:26

## 2018-03-13 RX ADMIN — GABAPENTIN 300 MILLIGRAM(S): 400 CAPSULE ORAL at 17:26

## 2018-03-13 RX ADMIN — Medication 100 MILLIGRAM(S): at 17:26

## 2018-03-13 RX ADMIN — GABAPENTIN 300 MILLIGRAM(S): 400 CAPSULE ORAL at 06:06

## 2018-03-13 RX ADMIN — POLYETHYLENE GLYCOL 3350 17 GRAM(S): 17 POWDER, FOR SOLUTION ORAL at 14:56

## 2018-03-13 RX ADMIN — Medication 100 MILLIGRAM(S): at 14:56

## 2018-03-13 RX ADMIN — Medication 100 MILLIGRAM(S): at 06:06

## 2018-03-13 RX ADMIN — Medication 2: at 11:44

## 2018-03-13 NOTE — PROGRESS NOTE ADULT - PROBLEM SELECTOR PLAN 1
Large right pleural effusion on the right most likely 2/2 to malignancy  - S/P thora with 1.5 L serosanguinous fluid removed on 03/11  - Pleural fluid exudative, cytology pending   - Plan for pleur ex on 03/14  - Hold Lovenox

## 2018-03-13 NOTE — PROGRESS NOTE ADULT - SUBJECTIVE AND OBJECTIVE BOX
Patient is a 82y old  Female who presents with a chief complaint of SOB, tiredness (10 Mar 2018 00:04)      INTERVAL HPI/OVERNIGHT EVENTS:  Patient with improved respiratory status, now comfortable on room air at rest.  No sob, no cough, no cp, no n/v/d/abd p/constipation/ f/c, no active bleeding  T(C): 36.7 (03-13-18 @ 12:28), Max: 37.2 (03-12-18 @ 21:34)  HR: 92 (03-13-18 @ 17:24) (78 - 98)  BP: 126/75 (03-13-18 @ 17:24) (124/67 - 139/75)  RR: 18 (03-13-18 @ 17:24) (18 - 18)  SpO2: 94% (03-13-18 @ 17:24) (90% - 94%)  Wt(kg): --  I&O's Summary    12 Mar 2018 07:01  -  13 Mar 2018 07:00  --------------------------------------------------------  IN: 960 mL / OUT: 620 mL / NET: 340 mL    13 Mar 2018 07:01  -  13 Mar 2018 18:35  --------------------------------------------------------  IN: 600 mL / OUT: 0 mL / NET: 600 mL      LABS:                        10.7   7.04  )-----------( 278      ( 13 Mar 2018 07:31 )             32.9     03-12    136  |  96  |  15  ----------------------------<  124<H>  3.7   |  27  |  1.29    Ca    9.6      12 Mar 2018 06:04  Phos  3.5     03-12  Mg     1.8     03-12    TPro  7.1  /  Alb  3.3  /  TBili  0.6  /  DBili  x   /  AST  40  /  ALT  21  /  AlkPhos  83  03-12    PTT - ( 13 Mar 2018 09:16 )  PTT:31.9 sec    CAPILLARY BLOOD GLUCOSE      POCT Blood Glucose.: 107 mg/dL (13 Mar 2018 16:47)  POCT Blood Glucose.: 208 mg/dL (13 Mar 2018 11:39)  POCT Blood Glucose.: 153 mg/dL (13 Mar 2018 08:15)  POCT Blood Glucose.: 201 mg/dL (12 Mar 2018 21:31)      REVIEW OF SYSTEMS:    RADIOLOGY & ADDITIONAL TESTS:    No recent imaging     Consultant(s) Notes Reviewed:  [ X] YES  [ ] NO    PHYSICAL EXAM:  GENERAL: Frail; NAD  NEURO: A&Ox3, No focal deficits  HEAD:  Atraumatic, Normocephalic  EOMI. PERRLA. Normal conjunctiva/sclera.  ENT: Neck supple. No JVD. Moist oral mucosa. Not edentulous. No thrush  LYMPH: Normal supraclavicular/cervical lymph nodes.   CARDIAC: RRR. S1. S2. grade 2 syst murmur. No rub.   LUNG/CHEST: Diminished lung sounds at right lung base. No wheezes. No rales. No rhonchi.  ABDOMEN: Soft. No tenderness. No distension. No fluid wave. Normal bowel sounds.  eczema    Care Discussed with Consultants/Other Providers [X ] YES  [ ] NO

## 2018-03-13 NOTE — PROGRESS NOTE ADULT - PROBLEM SELECTOR PLAN 7
- pt wishes all 3 of her children to make decisions for her if she is unable to  - Son to help with lovenox administration and pleur ex care   - pt wishes for full code

## 2018-03-13 NOTE — PROGRESS NOTE ADULT - PROBLEM SELECTOR PLAN 4
- 2/2 collapsed lung and pe  - Now resolved  - cont treatment for both as above  - Patient now saturating >90% at rest and with ambulation    - Will ambulate off 02 to assess for home 02 requirement

## 2018-03-14 LAB
ALBUMIN SERPL ELPH-MCNC: 3.8 G/DL — SIGNIFICANT CHANGE UP (ref 3.3–5)
ALP SERPL-CCNC: 98 U/L — SIGNIFICANT CHANGE UP (ref 40–120)
ALT FLD-CCNC: 29 U/L RC — SIGNIFICANT CHANGE UP (ref 10–45)
ANION GAP SERPL CALC-SCNC: 12 MMOL/L — SIGNIFICANT CHANGE UP (ref 5–17)
APTT BLD: 35.3 SEC — SIGNIFICANT CHANGE UP (ref 27.5–37.4)
AST SERPL-CCNC: 52 U/L — HIGH (ref 10–40)
BILIRUB SERPL-MCNC: 0.5 MG/DL — SIGNIFICANT CHANGE UP (ref 0.2–1.2)
BUN SERPL-MCNC: 17 MG/DL — SIGNIFICANT CHANGE UP (ref 7–23)
CALCIUM SERPL-MCNC: 10.1 MG/DL — SIGNIFICANT CHANGE UP (ref 8.4–10.5)
CHLORIDE SERPL-SCNC: 96 MMOL/L — SIGNIFICANT CHANGE UP (ref 96–108)
CO2 SERPL-SCNC: 30 MMOL/L — SIGNIFICANT CHANGE UP (ref 22–31)
CREAT SERPL-MCNC: 1.44 MG/DL — HIGH (ref 0.5–1.3)
GLUCOSE SERPL-MCNC: 173 MG/DL — HIGH (ref 70–99)
HCT VFR BLD CALC: 36.9 % — SIGNIFICANT CHANGE UP (ref 34.5–45)
HGB BLD-MCNC: 11.3 G/DL — LOW (ref 11.5–15.5)
INR BLD: 1.12 RATIO — SIGNIFICANT CHANGE UP (ref 0.88–1.16)
MAGNESIUM SERPL-MCNC: 1.9 MG/DL — SIGNIFICANT CHANGE UP (ref 1.6–2.6)
MCHC RBC-ENTMCNC: 29.4 PG — SIGNIFICANT CHANGE UP (ref 27–34)
MCHC RBC-ENTMCNC: 30.6 GM/DL — LOW (ref 32–36)
MCV RBC AUTO: 96.1 FL — SIGNIFICANT CHANGE UP (ref 80–100)
NRBC # BLD: 0 /100 WBCS — SIGNIFICANT CHANGE UP (ref 0–0)
PHOSPHATE SERPL-MCNC: 3.1 MG/DL — SIGNIFICANT CHANGE UP (ref 2.5–4.5)
PLATELET # BLD AUTO: 318 K/UL — SIGNIFICANT CHANGE UP (ref 150–400)
POTASSIUM SERPL-MCNC: 4.1 MMOL/L — SIGNIFICANT CHANGE UP (ref 3.5–5.3)
POTASSIUM SERPL-SCNC: 4.1 MMOL/L — SIGNIFICANT CHANGE UP (ref 3.5–5.3)
PROT SERPL-MCNC: 7.9 G/DL — SIGNIFICANT CHANGE UP (ref 6–8.3)
PROTHROM AB SERPL-ACNC: 12.7 SEC — SIGNIFICANT CHANGE UP (ref 10–13.1)
RBC # BLD: 3.84 M/UL — SIGNIFICANT CHANGE UP (ref 3.8–5.2)
RBC # FLD: 17.9 % — HIGH (ref 10.3–14.5)
SODIUM SERPL-SCNC: 138 MMOL/L — SIGNIFICANT CHANGE UP (ref 135–145)
WBC # BLD: 6.87 K/UL — SIGNIFICANT CHANGE UP (ref 3.8–10.5)
WBC # FLD AUTO: 6.87 K/UL — SIGNIFICANT CHANGE UP (ref 3.8–10.5)

## 2018-03-14 PROCEDURE — 99233 SBSQ HOSP IP/OBS HIGH 50: CPT | Mod: GC

## 2018-03-14 PROCEDURE — 99233 SBSQ HOSP IP/OBS HIGH 50: CPT

## 2018-03-14 RX ADMIN — Medication 100 MILLIGRAM(S): at 05:30

## 2018-03-14 RX ADMIN — Medication 100 MILLIGRAM(S): at 17:24

## 2018-03-14 RX ADMIN — GABAPENTIN 300 MILLIGRAM(S): 400 CAPSULE ORAL at 17:25

## 2018-03-14 RX ADMIN — Medication 100 MILLIGRAM(S): at 13:34

## 2018-03-14 RX ADMIN — AMLODIPINE BESYLATE 10 MILLIGRAM(S): 2.5 TABLET ORAL at 05:30

## 2018-03-14 RX ADMIN — GABAPENTIN 300 MILLIGRAM(S): 400 CAPSULE ORAL at 05:30

## 2018-03-14 RX ADMIN — Medication 100 MILLIGRAM(S): at 21:46

## 2018-03-14 RX ADMIN — POLYETHYLENE GLYCOL 3350 17 GRAM(S): 17 POWDER, FOR SOLUTION ORAL at 17:24

## 2018-03-14 RX ADMIN — Medication 1: at 16:47

## 2018-03-14 RX ADMIN — Medication 1: at 08:06

## 2018-03-14 NOTE — PROGRESS NOTE ADULT - PROBLEM SELECTOR PLAN 4
- 2/2 collapsed lung and pe  - Now resolved  - cont treatment for both as above  - Patient now saturating >90% at rest and with ambulation, no home 02 requirement expected

## 2018-03-14 NOTE — PROGRESS NOTE ADULT - ASSESSMENT
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

## 2018-03-14 NOTE — PROGRESS NOTE ADULT - SUBJECTIVE AND OBJECTIVE BOX
Chief Complaint: breast ca     INTERVAL HPI/OVERNIGHT EVENTS: SOB has improved. Has been off O2 today. Ambulated in hallway. Pleurix to be placed tomorrow.     MEDICATIONS  (STANDING):  amLODIPine   Tablet 10 milliGRAM(s) Oral daily  dextrose 5%. 1000 milliLiter(s) (50 mL/Hr) IV Continuous <Continuous>  dextrose 50% Injectable 12.5 Gram(s) IV Push once  dextrose 50% Injectable 25 Gram(s) IV Push once  dextrose 50% Injectable 25 Gram(s) IV Push once  docusate sodium 100 milliGRAM(s) Oral three times a day  gabapentin 300 milliGRAM(s) Oral two times a day  influenza   Vaccine 0.5 milliLiter(s) IntraMuscular once  insulin lispro (HumaLOG) corrective regimen sliding scale   SubCutaneous at bedtime  insulin lispro (HumaLOG) corrective regimen sliding scale   SubCutaneous three times a day before meals  labetalol 100 milliGRAM(s) Oral two times a day    MEDICATIONS  (PRN):  dextrose Gel 1 Dose(s) Oral once PRN Blood Glucose LESS THAN 70 milliGRAM(s)/deciliter  glucagon  Injectable 1 milliGRAM(s) IntraMuscular once PRN Glucose LESS THAN 70 milligrams/deciliter  oxyCODONE    5 mG/acetaminophen 325 mG 1 Tablet(s) Oral every 6 hours PRN Moderate Pain (4 - 6)  polyethylene glycol 3350 17 Gram(s) Oral two times a day PRN Constipation  senna 2 Tablet(s) Oral at bedtime PRN Constipation      Allergies    dust (Other)  No Known Drug Allergies    Intolerances    codeine (Nausea)      ROS: as above     Vital Signs Last 24 Hrs  T(C): 36.8 (14 Mar 2018 20:28), Max: 36.9 (14 Mar 2018 04:56)  T(F): 98.2 (14 Mar 2018 20:28), Max: 98.5 (14 Mar 2018 04:56)  HR: 82 (14 Mar 2018 20:28) (72 - 86)  BP: 164/83 (14 Mar 2018 20:28) (141/67 - 164/83)  BP(mean): --  RR: 19 (14 Mar 2018 20:28) (16 - 19)  SpO2: 95% (14 Mar 2018 20:28) (90% - 95%)    Physical Exam:   AAO x 3, NAD   RRR S1S2  decr BS  soft NTNDBS+  no c/c/e    LABS:                        11.3   6.87  )-----------( 318      ( 14 Mar 2018 07:38 )             36.9     03-14    138  |  96  |  17  ----------------------------<  173<H>  4.1   |  30  |  1.44<H>    Ca    10.1      14 Mar 2018 06:28  Phos  3.1     03-14  Mg     1.9     03-14    TPro  7.9  /  Alb  3.8  /  TBili  0.5  /  DBili  x   /  AST  52<H>  /  ALT  29  /  AlkPhos  98  03-14    PT/INR - ( 14 Mar 2018 07:45 )   PT: 12.7 sec;   INR: 1.12 ratio         PTT - ( 14 Mar 2018 07:45 )  PTT:35.3 sec

## 2018-03-14 NOTE — PROGRESS NOTE ADULT - SUBJECTIVE AND OBJECTIVE BOX
Patient is a 82y old  Female who presents with a chief complaint of SOB, tiredness (10 Mar 2018 00:04)      INTERVAL HPI/OVERNIGHT EVENTS:  T(C): 36.6 (03-14-18 @ 13:37), Max: 36.9 (03-14-18 @ 04:56)  HR: 84 (03-14-18 @ 13:37) (72 - 92)  BP: 148/69 (03-14-18 @ 13:37) (126/75 - 154/70)  RR: 18 (03-14-18 @ 13:37) (16 - 18)  SpO2: 94% (03-14-18 @ 13:37) (90% - 94%)  Wt(kg): --  I&O's Summary    13 Mar 2018 07:01  -  14 Mar 2018 07:00  --------------------------------------------------------  IN: 810 mL / OUT: 0 mL / NET: 810 mL    14 Mar 2018 07:01  -  14 Mar 2018 14:53  --------------------------------------------------------  IN: 360 mL / OUT: 0 mL / NET: 360 mL        LABS:                        11.3   6.87  )-----------( 318      ( 14 Mar 2018 07:38 )             36.9     03-14    138  |  96  |  17  ----------------------------<  173<H>  4.1   |  30  |  1.44<H>    Ca    10.1      14 Mar 2018 06:28  Phos  3.1     03-14  Mg     1.9     03-14    TPro  7.9  /  Alb  3.8  /  TBili  0.5  /  DBili  x   /  AST  52<H>  /  ALT  29  /  AlkPhos  98  03-14    PT/INR - ( 14 Mar 2018 07:45 )   PT: 12.7 sec;   INR: 1.12 ratio         PTT - ( 14 Mar 2018 07:45 )  PTT:35.3 sec    CAPILLARY BLOOD GLUCOSE      POCT Blood Glucose.: 138 mg/dL (14 Mar 2018 12:01)  POCT Blood Glucose.: 156 mg/dL (14 Mar 2018 07:57)  POCT Blood Glucose.: 170 mg/dL (13 Mar 2018 21:04)  POCT Blood Glucose.: 107 mg/dL (13 Mar 2018 16:47)          REVIEW OF SYSTEMS:  CONSTITUTIONAL: + weakness, no fevers or chills  EYES/ENT: No visual changes;  No vertigo or throat pain   NECK: No pain or stiffness  RESPIRATORY: dyspnea significantly improved, No cough, no wheezing  CARDIOVASCULAR: No chest pain, no palpitations  GASTROINTESTINAL: No abdominal or epigastric pain. No nausea, vomiting, or hematemesis; No diarrhea or constipation. No melena or hematochezia.  GENITOURINARY: No dysuria, frequency or hematuria  NEUROLOGICAL: No numbness, no paresthesias, no headache    RADIOLOGY & ADDITIONAL TESTS:    No recent imaging     PHYSICAL EXAM:  GENERAL: Frail; NAD  HEAD:  Atraumatic, Normocephalic  EOMI. PERRLA. Normal conjunctiva/sclera.  ENT: Neck supple. No JVD. Moist oral mucosa. Not edentulous. No thrush.  LYMPH: Normal supraclavicular/cervical lymph nodes.   CARDIAC: RRR. S1. S2. grade 2 syst murmur. No rub.   LUNG/CHEST: Diminished lung sounds at right lung base. No wheezes. No rales. No rhonchi.  Neuro: A&Ox3, No focal deficits    Care Discussed with Consultants/Other Providers [ ] YES  [ ] NO

## 2018-03-14 NOTE — PROGRESS NOTE ADULT - PROBLEM SELECTOR PLAN 1
Xeloda permanently d/c in setting of progression of disease.   Chemotherapy regimen to be changed as an out pt. No plans for in pt chemotherapy  [place]. [place]man [place] [facility] [place]

## 2018-03-14 NOTE — PROGRESS NOTE ADULT - PROBLEM SELECTOR PLAN 1
Large right pleural effusion on the right most likely 2/2 to malignancy  - S/P thora with 1.5 L serosanguinous fluid removed on 03/11  - Pleural fluid exudative, cytology pending   - Plan for pleur ex today  - Hold Lovenox prior to CT placement, restart following Large right pleural effusion on the right most likely 2/2 to malignancy  - S/P thora with 1.5 L serosanguinous fluid removed on 03/11  - Pleural fluid exudative, cytology pending   - Plan for pleur ex tomorrow given patient received AC last night  - Hold Lovenox prior to CT placement, restart following procedure

## 2018-03-15 ENCOUNTER — RESULT REVIEW (OUTPATIENT)
Age: 83
End: 2018-03-15

## 2018-03-15 ENCOUNTER — TRANSCRIPTION ENCOUNTER (OUTPATIENT)
Age: 83
End: 2018-03-15

## 2018-03-15 LAB
ANION GAP SERPL CALC-SCNC: 11 MMOL/L — SIGNIFICANT CHANGE UP (ref 5–17)
APTT BLD: 32 SEC — SIGNIFICANT CHANGE UP (ref 27.5–37.4)
BUN SERPL-MCNC: 15 MG/DL — SIGNIFICANT CHANGE UP (ref 7–23)
CALCIUM SERPL-MCNC: 9.6 MG/DL — SIGNIFICANT CHANGE UP (ref 8.4–10.5)
CHLORIDE SERPL-SCNC: 101 MMOL/L — SIGNIFICANT CHANGE UP (ref 96–108)
CO2 SERPL-SCNC: 27 MMOL/L — SIGNIFICANT CHANGE UP (ref 22–31)
CREAT SERPL-MCNC: 1.17 MG/DL — SIGNIFICANT CHANGE UP (ref 0.5–1.3)
GLUCOSE SERPL-MCNC: 189 MG/DL — HIGH (ref 70–99)
INR BLD: 1.04 RATIO — SIGNIFICANT CHANGE UP (ref 0.88–1.16)
POTASSIUM SERPL-MCNC: 4.2 MMOL/L — SIGNIFICANT CHANGE UP (ref 3.5–5.3)
POTASSIUM SERPL-SCNC: 4.2 MMOL/L — SIGNIFICANT CHANGE UP (ref 3.5–5.3)
PROTHROM AB SERPL-ACNC: 11.4 SEC — SIGNIFICANT CHANGE UP (ref 9.8–12.7)
SODIUM SERPL-SCNC: 139 MMOL/L — SIGNIFICANT CHANGE UP (ref 135–145)

## 2018-03-15 PROCEDURE — 88305 TISSUE EXAM BY PATHOLOGIST: CPT | Mod: 26

## 2018-03-15 PROCEDURE — 71045 X-RAY EXAM CHEST 1 VIEW: CPT | Mod: 26

## 2018-03-15 PROCEDURE — 88112 CYTOPATH CELL ENHANCE TECH: CPT | Mod: 26

## 2018-03-15 PROCEDURE — 32557 INSERT CATH PLEURA W/ IMAGE: CPT | Mod: GC

## 2018-03-15 PROCEDURE — 88342 IMHCHEM/IMCYTCHM 1ST ANTB: CPT | Mod: 26

## 2018-03-15 PROCEDURE — 99233 SBSQ HOSP IP/OBS HIGH 50: CPT | Mod: GC

## 2018-03-15 PROCEDURE — 88341 IMHCHEM/IMCYTCHM EA ADD ANTB: CPT | Mod: 26

## 2018-03-15 RX ORDER — SODIUM CHLORIDE 9 MG/ML
1000 INJECTION INTRAMUSCULAR; INTRAVENOUS; SUBCUTANEOUS ONCE
Qty: 0 | Refills: 0 | Status: COMPLETED | OUTPATIENT
Start: 2018-03-15 | End: 2018-03-15

## 2018-03-15 RX ADMIN — SODIUM CHLORIDE 1000 MILLILITER(S): 9 INJECTION INTRAMUSCULAR; INTRAVENOUS; SUBCUTANEOUS at 07:57

## 2018-03-15 RX ADMIN — Medication 1: at 07:59

## 2018-03-15 RX ADMIN — AMLODIPINE BESYLATE 10 MILLIGRAM(S): 2.5 TABLET ORAL at 05:56

## 2018-03-15 RX ADMIN — Medication 100 MILLIGRAM(S): at 05:56

## 2018-03-15 RX ADMIN — GABAPENTIN 300 MILLIGRAM(S): 400 CAPSULE ORAL at 18:21

## 2018-03-15 RX ADMIN — GABAPENTIN 300 MILLIGRAM(S): 400 CAPSULE ORAL at 05:56

## 2018-03-15 RX ADMIN — Medication 1: at 12:14

## 2018-03-15 RX ADMIN — Medication 1: at 18:20

## 2018-03-15 RX ADMIN — Medication 100 MILLIGRAM(S): at 18:22

## 2018-03-15 RX ADMIN — Medication 100 MILLIGRAM(S): at 11:03

## 2018-03-15 NOTE — DISCHARGE NOTE ADULT - [id number]_GEN_ALL_CORE
rehabilitation services/teaching and training/wound care and assessment/medication teaching and assessment/observation and assessment

## 2018-03-15 NOTE — DISCHARGE NOTE ADULT - MEDICATION SUMMARY - MEDICATIONS TO TAKE
I will START or STAY ON the medications listed below when I get home from the hospital:    CeleBREX 100 mg oral capsule  --  by mouth 2 times a day  -- Indication: For Pain    quinapril 40 mg oral tablet  -- 1 tab(s) by mouth once a day  -- Indication: For Hypertension    gabapentin 300 mg oral capsule  -- 1 cap(s) by mouth 2 times a day  -- Indication: For Pain    metFORMIN 1000 mg oral tablet  -- 1 tab(s) by mouth 2 times a day  -- Indication: For Diabetes mellitus    Xeloda 500 mg oral tablet  -- 3 tab(s) by mouth every 12 hours, every other week  -- Indication: For Cancer    labetalol 100 mg oral tablet  --  by mouth once a day  -- Indication: For Hypertension    amLODIPine 10 mg oral tablet  -- 1 tab(s) by mouth once a day  -- Indication: For Hypertension    Vitamin D3  -- 100 milligram(s) by mouth once a day  -- Indication: For Prophylactic measure    Vitamin B12 100 mcg oral tablet  -- 1 tab(s) by mouth once a day  -- Indication: For Prophylactic measure

## 2018-03-15 NOTE — DISCHARGE NOTE ADULT - PATIENT PORTAL LINK FT
You can access the [email or web address] Patient Portal, offered by [facility], by registering with the following website: [email or web address]

## 2018-03-15 NOTE — PROGRESS NOTE ADULT - SUBJECTIVE AND OBJECTIVE BOX
Patient is a 82y old  Female who presents with a chief complaint of SOB, tiredness (15 Mar 2018 10:18)      INTERVAL HPI/OVERNIGHT EVENTS:  No overnight events, no new complaints     T(C): 37 (03-15-18 @ 11:25), Max: 37.2 (03-15-18 @ 00:00)  HR: 86 (03-15-18 @ 11:25) (82 - 93)  BP: 158/74 (03-15-18 @ 11:25) (118/65 - 164/83)  RR: 18 (03-15-18 @ 11:25) (16 - 19)  SpO2: 96% (03-15-18 @ 11:25) (94% - 96%)  Wt(kg): --  I&O's Summary    14 Mar 2018 07:01  -  15 Mar 2018 07:00  --------------------------------------------------------  IN: 600 mL / OUT: 0 mL / NET: 600 mL    15 Mar 2018 07:01  -  15 Mar 2018 15:53  --------------------------------------------------------  IN: 1480 mL / OUT: 0 mL / NET: 1480 mL        LABS:                        11.3   6.87  )-----------( 318      ( 14 Mar 2018 07:38 )             36.9     03-15    139  |  101  |  15  ----------------------------<  189<H>  4.2   |  27  |  1.17    Ca    9.6      15 Mar 2018 10:57  Phos  3.1     03-14  Mg     1.9     03-14    TPro  7.9  /  Alb  3.8  /  TBili  0.5  /  DBili  x   /  AST  52<H>  /  ALT  29  /  AlkPhos  98  03-14    PT/INR - ( 15 Mar 2018 07:59 )   PT: 11.4 sec;   INR: 1.04 ratio         PTT - ( 15 Mar 2018 07:59 )  PTT:32.0 sec    CAPILLARY BLOOD GLUCOSE      POCT Blood Glucose.: 190 mg/dL (15 Mar 2018 11:44)  POCT Blood Glucose.: 159 mg/dL (15 Mar 2018 07:51)  POCT Blood Glucose.: 158 mg/dL (14 Mar 2018 20:59)  POCT Blood Glucose.: 170 mg/dL (14 Mar 2018 16:31)          REVIEW OF SYSTEMS:  CONSTITUTIONAL: + weakness, no fevers or chills  EYES/ENT: No visual changes;  No vertigo or throat pain   NECK: No pain or stiffness  RESPIRATORY: dyspnea significantly improved, No cough, no wheezing  CARDIOVASCULAR: No chest pain, no palpitations  GASTROINTESTINAL: No abdominal or epigastric pain. No nausea, vomiting, or hematemesis; No diarrhea or constipation. No melena or hematochezia.  GENITOURINARY: No dysuria, frequency or hematuria  NEUROLOGICAL: No numbness, no paresthesias, no headache    RADIOLOGY & ADDITIONAL TESTS:    Consultant(s) Notes Reviewed:  [X ] YES  [ ] NO    PHYSICAL EXAM:  GENERAL: Frail; NAD  HEAD:  Atraumatic, Normocephalic  EOMI. PERRLA. Normal conjunctiva/sclera.  ENT: Neck supple. No JVD. Moist oral mucosa. Not edentulous. No thrush.  LYMPH: Normal supraclavicular/cervical lymph nodes.   CARDIAC: RRR. S1. S2. grade 2 syst murmur. No rub.   LUNG/CHEST: Diminished lung sounds at right lung base. No wheezes. No rales. No rhonchi.  Neuro: A&Ox3, No focal deficits      Care Discussed with Consultants/Other Providers [ X] YES  [ ] NO

## 2018-03-15 NOTE — PROGRESS NOTE ADULT - PROBLEM SELECTOR PLAN 2
- CTA showing subsegmental PE of LLL  - No RHS on CTA or TTE  - hemodynamically stable  - Continue therapeutic Lovenox, will hold for Pleur-ex insertion, and restart following insertion

## 2018-03-15 NOTE — DISCHARGE NOTE ADULT - HOME CARE AGENCY
A nurse will visit you in your home from [facility].  They will contact you to confirm their visit.  Call [phone] or [phone] with any questions.

## 2018-03-15 NOTE — DISCHARGE NOTE ADULT - PLAN OF CARE
Maintenance of the chest tube (pleurex catheter). Please follow up with the pulmonologist on 3/27/18 at 2pm. You were admitted to the hospital with shortness of breath. You were found to have fluid around your lungs. This fluid was thought to be due to your cancer. It was drained and a catheter was placed for continuous drainage. Please return to the emergency room if you have difficulty breathing, develop fever or chills as these may indicate problems with the tube. Management of lung clot. Please follow up with your primary care physician within 1-2 weeks You were found to have small clots in your lungs. You were treated with blood thinning medications to prevent these clots from getting bigger. Please continue to take this medication until otherwise told by your doctor. Please return to the hospital if you develop persistent shortness of breath or swelling in your legs as this may be indicative of worsening clot. Management of cancer. Please follow up with your oncologist, Dr. Silverman, within 1-2 weeks You were seen by hematology during this admission. Your xeloda was held. Your chemotherapy regimen will be changed as an outpatient. Please follow up with Dr. Silverman.

## 2018-03-15 NOTE — DISCHARGE NOTE ADULT - HOSPITAL COURSE
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  Imaging in the form of a CTA of the chest was ordered which revealed complete right lung collapsed 2/2 large right pleural effusion and new LLL subsegmental PE.  TTE was negative for signs of right heart strain.  Thoracentesis of effusion was performed by Pulm on 3/11 and 1.5L of serosanguinous fluid was drained and pt tolerated procedure without any complications.  Pleural fluid was exudative in nature.  Post procedure CXR showed re-expansion of lung and absence of pneumothorax.  After procedure pt consistently saturating >90% at rest on room air.  Pt was restarted on lovenox for therapeutic AC of PE.  Heme-Onc was consulted and held xeloda, but will to modify chemotherapy regimen as an outpatient. Pulm performed placement of Pleur-ex catheter on 3/15. 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  Imaging in the form of a CTA of the chest was ordered which revealed complete right lung collapsed 2/2 large right pleural effusion and new LLL subsegmental PE.  TTE was negative for signs of right heart strain.  Thoracentesis of effusion was performed by Pulm on 3/11 and 1.5L of serosanguinous fluid was drained and pt tolerated procedure without any complications.  Pleural fluid was exudative in nature.  Cytopathology of pleural fluid was positive for malignant cells.  Post procedure CXR showed re-expansion of lung and absence of pneumothorax.  After procedure pt consistently saturating >90% at rest on room air.  Pt was restarted on lovenox for therapeutic AC of PE.  Heme-Onc was consulted and held xeloda, but will to modify chemotherapy regimen as an outpatient. Pulm performed placement of Pleur-ex catheter on 3/15. 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  Imaging in the form of a CTA of the chest was ordered which revealed complete right lung collapsed 2/2 large right pleural effusion and new LLL subsegmental PE.  TTE was negative for signs of right heart strain.  Thoracentesis of effusion was performed by Pulm on 3/11 and 1.5L of serosanguinous fluid was drained and pt tolerated procedure without any complications.  Pleural fluid was exudative in nature.  Cytopathology of pleural fluid was positive for malignant cells.  Post procedure CXR showed re-expansion of lung and absence of pneumothorax.  After procedure pt consistently saturating >90% at rest on room air.  Pt was restarted on lovenox for therapeutic AC of PE.  Heme-Onc was consulted and held xeloda, but will to modify chemotherapy regimen as an outpatient. Pulm performed placement of Pleur-ex catheter on 3/15.      Total discharge time: 47 minutes. 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  Imaging in the form of a CTA of the chest was ordered which revealed complete right lung collapsed 2/2 large right pleural effusion and new LLL subsegmental PE.  TTE was negative for signs of right heart strain.  Thoracentesis of effusion was performed by Pulm on 3/11 and 1.5L of serosanguinous fluid was drained and pt tolerated procedure without any complications.  Pleural fluid was exudative in nature.  Cytopathology of pleural fluid was positive for malignant cells.  Post procedure CXR showed re-expansion of lung and absence of pneumothorax.  After procedure pt consistently saturating >90% at rest on room air.  Pt was restarted on lovenox for therapeutic AC of PE.  Heme-Onc was consulted and held xeloda, but will to modify chemotherapy regimen as an outpatient. Pulm performed placement of Pleur-ex catheter on 3/15.    FOR FOLLOW UP  Please follow up with the pulmonologist on 3/27/18 at 2pm.  Please follow up with your primary care physician within 1-2 weeks  At home, pleurex catheter needs 500 cc drained by visiting nurses 3X/week     Total discharge time: 47 minutes. 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  Imaging in the form of a CTA of the chest was ordered which revealed complete right lung collapsed 2/2 large right pleural effusion and new LLL subsegmental PE.  TTE was negative for signs of right heart strain.  Thoracentesis of effusion was performed by Pulm on 3/11 and 1.5L of serosanguinous fluid was drained and pt tolerated procedure without any complications.  Pleural fluid was exudative in nature.  Cytopathology of pleural fluid was positive for malignant cells.  Post procedure CXR showed re-expansion of lung and absence of pneumothorax.  After procedure pt consistently saturating >90% at rest on room air.  Pt was restarted on lovenox for therapeutic AC of PE.  Heme-Onc was consulted and held xeloda, but will to modify chemotherapy regimen as an outpatient. Pulm performed placement of Pleur-ex catheter on 3/15.    FOR FOLLOW UP  Please follow up with the pulmonologist on 3/27/18 at 2pm.  Please follow up with your primary care physician within 1-2 weeks  Please follow up with your oncologist, Dr. Silverman, within 1-2 weeks  At home, pleurex catheter needs 500 cc drained by visiting nurses 3X/week     Total discharge time: 47 minutes.

## 2018-03-15 NOTE — DISCHARGE NOTE ADULT - ADDITIONAL INSTRUCTIONS
Please follow up with the pulmonologist on 3/27/18 at 2pm.  Please follow up with your primary care physician within 1-2 weeks  At home, pleurex catheter needs 500 cc drained by visiting nurses 3X/week Please follow up with the pulmonologist on 3/27/18 at 2pm.  Please follow up with your primary care physician within 1-2 weeks  Please follow up with your oncologist, Dr. Silverman, within 1-2 weeks  At home, pleurex catheter needs 500 cc drained by visiting nurses 3X/week

## 2018-03-15 NOTE — DISCHARGE NOTE ADULT - CARE PLAN
Principal Discharge DX:	Acute respiratory failure with hypoxia  Goal:	Maintenance of the chest tube (pleurex catheter). Please follow up with the pulmonologist on 3/27/18 at 2pm. Principal Discharge DX:	Acute respiratory failure with hypoxia  Goal:	Maintenance of the chest tube (pleurex catheter). Please follow up with the pulmonologist on 3/27/18 at 2pm.  Assessment and plan of treatment:	You were admitted to the hospital with shortness of breath. You were found to have fluid around your lungs. This fluid was thought to be due to your cancer. It was drained and a catheter was placed for continuous drainage. Please return to the emergency room if you have difficulty breathing, develop fever or chills as these may indicate problems with the tube.  Secondary Diagnosis:	Pulmonary embolism  Goal:	Management of lung clot. Please follow up with your primary care physician within 1-2 weeks  Assessment and plan of treatment:	You were found to have small clots in your lungs. You were treated with blood thinning medications to prevent these clots from getting bigger. Please continue to take this medication until otherwise told by your doctor. Please return to the hospital if you develop persistent shortness of breath or swelling in your legs as this may be indicative of worsening clot. Principal Discharge DX:	Acute respiratory failure with hypoxia  Goal:	Maintenance of the chest tube (pleurex catheter). Please follow up with the pulmonologist on 3/27/18 at 2pm.  Assessment and plan of treatment:	You were admitted to the hospital with shortness of breath. You were found to have fluid around your lungs. This fluid was thought to be due to your cancer. It was drained and a catheter was placed for continuous drainage. Please return to the emergency room if you have difficulty breathing, develop fever or chills as these may indicate problems with the tube.  Secondary Diagnosis:	Pulmonary embolism  Goal:	Management of lung clot. Please follow up with your primary care physician within 1-2 weeks  Assessment and plan of treatment:	You were found to have small clots in your lungs. You were treated with blood thinning medications to prevent these clots from getting bigger. Please continue to take this medication until otherwise told by your doctor. Please return to the hospital if you develop persistent shortness of breath or swelling in your legs as this may be indicative of worsening clot.  Secondary Diagnosis:	Breast cancer, left  Goal:	Management of cancer. Please follow up with your oncologist, Dr. Silverman, within 1-2 weeks  Assessment and plan of treatment:	You were seen by hematology during this admission. Your xeloda was held. Your chemotherapy regimen will be changed as an outpatient. Please follow up with Dr. Silverman.

## 2018-03-15 NOTE — CHART NOTE - NSCHARTNOTEFT_GEN_A_CORE
Indication: Malignant Pleural Effusion    Operators:  Gregorio Mclain and Adenike    Pre-op Dx: Malignant Pleural effusion    Preop medications: Lidocaine 20 cc given locally    Xray/CT Findings: Large right sided effusion    Procedure: The patient was consented and a timeout was done. The area was identified with ultrasound and marked with a surgical pen. The patient was then given 1% lidocaine in the area where the fluid would be accessed and the tract to the outer catheter area. The pleural fluid was accessed with a 18 gauge needle and a guidewire was placed for guidance. The guidewire was confirmed in the pleural space with ultrasound. A surgical cut was performed at the guidewire location and in the area where the catheter would exit. A tract was tunneled between the two cuts and the catheter was guided through. A large plastic tube was used to access the pleural space with the guidewire and then the guidewire was removed. The catheter was placed through the tube and then peeled off. There was 900 cc of fluid that was removed. The area was sutured and the dressed. The patient tolerated the procedure well.     CXR was ordered. Catheter location confirmed.       Recommendations:   - please drain the PleurX in the 500 cc kit ancelmo.   - please hold the Lovenox for tonight.   - will continue to follow.     Thank you.     Chase Mclain MD   Pulmonary Fellow Indication: Malignant Pleural Effusion    Operators:  Gregorio Mcalin and Adenike    Pre-op Dx: Malignant Pleural effusion    Preop medications: Lidocaine 20 cc given locally    Xray/CT Findings: Large right sided effusion    Procedure: The patient was consented and a timeout was done. The area was identified with ultrasound and marked with a surgical pen. The patient was then given 1% lidocaine in the area where the fluid would be accessed and the tract to the outer catheter area. The pleural fluid was accessed with a 18 gauge needle and a guidewire was placed for guidance. The guidewire was confirmed in the pleural space with ultrasound. A surgical cut was performed at the guidewire location and in the area where the catheter would exit. A tract was tunneled between the two cuts and the catheter was guided through. A large plastic tube was used to access the pleural space with the guidewire and then the guidewire was removed. The catheter was placed through the tube and then peeled off. There was 900 cc of fluid that was removed. The area was sutured and the dressed. The patient tolerated the procedure well.     CXR was ordered. Catheter location confirmed.       Recommendations:   - please drain the PleurX in the 500 cc kit ancelmo.   - please hold the Lovenox for tonight.   - will continue to follow.     Thank you.     Chase Mclain MD   Pulmonary Fellow      I was present throughout the entire procedure.  Patient tolerated procedure well.

## 2018-03-15 NOTE — DISCHARGE NOTE ADULT - CARE PROVIDER_API CALL
Yancy Jeong), Critical Care Medicine; Pulmonary Disease  [address] [place] [address]  Phone: [phone]  Fax: [phone] Yancy Jeong), Critical Care Medicine; Pulmonary Disease  [address] [facility]  [address]  [place] [address]  Phone: [phone]  Fax: [phone]    Maverick Silverman), Hematology; Medical Oncology  [address] [place] [address]  Phone: [phone]  Fax: [phone]

## 2018-03-16 VITALS
TEMPERATURE: 98 F | HEART RATE: 86 BPM | OXYGEN SATURATION: 97 % | DIASTOLIC BLOOD PRESSURE: 74 MMHG | SYSTOLIC BLOOD PRESSURE: 147 MMHG | RESPIRATION RATE: 18 BRPM

## 2018-03-16 DIAGNOSIS — K59.00 CONSTIPATION, UNSPECIFIED: ICD-10-CM

## 2018-03-16 LAB
ANION GAP SERPL CALC-SCNC: 12 MMOL/L — SIGNIFICANT CHANGE UP (ref 5–17)
BUN SERPL-MCNC: 17 MG/DL — SIGNIFICANT CHANGE UP (ref 7–23)
CALCIUM SERPL-MCNC: 9.8 MG/DL — SIGNIFICANT CHANGE UP (ref 8.4–10.5)
CHLORIDE SERPL-SCNC: 101 MMOL/L — SIGNIFICANT CHANGE UP (ref 96–108)
CO2 SERPL-SCNC: 26 MMOL/L — SIGNIFICANT CHANGE UP (ref 22–31)
CREAT SERPL-MCNC: 1.25 MG/DL — SIGNIFICANT CHANGE UP (ref 0.5–1.3)
GLUCOSE SERPL-MCNC: 175 MG/DL — HIGH (ref 70–99)
HCT VFR BLD CALC: 34.7 % — SIGNIFICANT CHANGE UP (ref 34.5–45)
HGB BLD-MCNC: 10.8 G/DL — LOW (ref 11.5–15.5)
MAGNESIUM SERPL-MCNC: 1.9 MG/DL — SIGNIFICANT CHANGE UP (ref 1.6–2.6)
MCHC RBC-ENTMCNC: 30 PG — SIGNIFICANT CHANGE UP (ref 27–34)
MCHC RBC-ENTMCNC: 31.1 GM/DL — LOW (ref 32–36)
MCV RBC AUTO: 96.4 FL — SIGNIFICANT CHANGE UP (ref 80–100)
NRBC # BLD: 0 /100 WBCS — SIGNIFICANT CHANGE UP (ref 0–0)
PHOSPHATE SERPL-MCNC: 3.2 MG/DL — SIGNIFICANT CHANGE UP (ref 2.5–4.5)
PLATELET # BLD AUTO: 323 K/UL — SIGNIFICANT CHANGE UP (ref 150–400)
POTASSIUM SERPL-MCNC: 4.1 MMOL/L — SIGNIFICANT CHANGE UP (ref 3.5–5.3)
POTASSIUM SERPL-SCNC: 4.1 MMOL/L — SIGNIFICANT CHANGE UP (ref 3.5–5.3)
RBC # BLD: 3.6 M/UL — LOW (ref 3.8–5.2)
RBC # FLD: 18 % — HIGH (ref 10.3–14.5)
SODIUM SERPL-SCNC: 139 MMOL/L — SIGNIFICANT CHANGE UP (ref 135–145)
WBC # BLD: 7.26 K/UL — SIGNIFICANT CHANGE UP (ref 3.8–10.5)
WBC # FLD AUTO: 7.26 K/UL — SIGNIFICANT CHANGE UP (ref 3.8–10.5)

## 2018-03-16 PROCEDURE — 85610 PROTHROMBIN TIME: CPT

## 2018-03-16 PROCEDURE — 83986 ASSAY PH BODY FLUID NOS: CPT

## 2018-03-16 PROCEDURE — 88360 TUMOR IMMUNOHISTOCHEM/MANUAL: CPT

## 2018-03-16 PROCEDURE — 87015 SPECIMEN INFECT AGNT CONCNTJ: CPT

## 2018-03-16 PROCEDURE — 84132 ASSAY OF SERUM POTASSIUM: CPT

## 2018-03-16 PROCEDURE — 71046 X-RAY EXAM CHEST 2 VIEWS: CPT

## 2018-03-16 PROCEDURE — 82042 OTHER SOURCE ALBUMIN QUAN EA: CPT

## 2018-03-16 PROCEDURE — 87116 MYCOBACTERIA CULTURE: CPT

## 2018-03-16 PROCEDURE — 82962 GLUCOSE BLOOD TEST: CPT

## 2018-03-16 PROCEDURE — 84295 ASSAY OF SERUM SODIUM: CPT

## 2018-03-16 PROCEDURE — 99233 SBSQ HOSP IP/OBS HIGH 50: CPT | Mod: GC

## 2018-03-16 PROCEDURE — 87186 SC STD MICRODIL/AGAR DIL: CPT

## 2018-03-16 PROCEDURE — 88342 IMHCHEM/IMCYTCHM 1ST ANTB: CPT

## 2018-03-16 PROCEDURE — 89051 BODY FLUID CELL COUNT: CPT

## 2018-03-16 PROCEDURE — 82330 ASSAY OF CALCIUM: CPT

## 2018-03-16 PROCEDURE — 87075 CULTR BACTERIA EXCEPT BLOOD: CPT

## 2018-03-16 PROCEDURE — 99285 EMERGENCY DEPT VISIT HI MDM: CPT | Mod: 25

## 2018-03-16 PROCEDURE — 82435 ASSAY OF BLOOD CHLORIDE: CPT

## 2018-03-16 PROCEDURE — 87102 FUNGUS ISOLATION CULTURE: CPT

## 2018-03-16 PROCEDURE — 93005 ELECTROCARDIOGRAM TRACING: CPT

## 2018-03-16 PROCEDURE — 84157 ASSAY OF PROTEIN OTHER: CPT

## 2018-03-16 PROCEDURE — 83735 ASSAY OF MAGNESIUM: CPT

## 2018-03-16 PROCEDURE — 88341 IMHCHEM/IMCYTCHM EA ADD ANTB: CPT

## 2018-03-16 PROCEDURE — 80048 BASIC METABOLIC PNL TOTAL CA: CPT

## 2018-03-16 PROCEDURE — 85027 COMPLETE CBC AUTOMATED: CPT

## 2018-03-16 PROCEDURE — 80053 COMPREHEN METABOLIC PANEL: CPT

## 2018-03-16 PROCEDURE — 88112 CYTOPATH CELL ENHANCE TECH: CPT

## 2018-03-16 PROCEDURE — 93970 EXTREMITY STUDY: CPT

## 2018-03-16 PROCEDURE — 87205 SMEAR GRAM STAIN: CPT

## 2018-03-16 PROCEDURE — 82947 ASSAY GLUCOSE BLOOD QUANT: CPT

## 2018-03-16 PROCEDURE — 88305 TISSUE EXAM BY PATHOLOGIST: CPT

## 2018-03-16 PROCEDURE — 96372 THER/PROPH/DIAG INJ SC/IM: CPT

## 2018-03-16 PROCEDURE — 85014 HEMATOCRIT: CPT

## 2018-03-16 PROCEDURE — 83880 ASSAY OF NATRIURETIC PEPTIDE: CPT

## 2018-03-16 PROCEDURE — 71275 CT ANGIOGRAPHY CHEST: CPT

## 2018-03-16 PROCEDURE — 93308 TTE F-UP OR LMTD: CPT

## 2018-03-16 PROCEDURE — 85730 THROMBOPLASTIN TIME PARTIAL: CPT

## 2018-03-16 PROCEDURE — 97161 PT EVAL LOW COMPLEX 20 MIN: CPT

## 2018-03-16 PROCEDURE — 83615 LACTATE (LD) (LDH) ENZYME: CPT

## 2018-03-16 PROCEDURE — 84100 ASSAY OF PHOSPHORUS: CPT

## 2018-03-16 PROCEDURE — 97116 GAIT TRAINING THERAPY: CPT

## 2018-03-16 PROCEDURE — 82945 GLUCOSE OTHER FLUID: CPT

## 2018-03-16 PROCEDURE — 97530 THERAPEUTIC ACTIVITIES: CPT

## 2018-03-16 PROCEDURE — 93306 TTE W/DOPPLER COMPLETE: CPT

## 2018-03-16 PROCEDURE — 83605 ASSAY OF LACTIC ACID: CPT

## 2018-03-16 PROCEDURE — 71045 X-RAY EXAM CHEST 1 VIEW: CPT

## 2018-03-16 PROCEDURE — 87070 CULTURE OTHR SPECIMN AEROBIC: CPT

## 2018-03-16 PROCEDURE — 84484 ASSAY OF TROPONIN QUANT: CPT

## 2018-03-16 PROCEDURE — 82803 BLOOD GASES ANY COMBINATION: CPT

## 2018-03-16 PROCEDURE — 87206 SMEAR FLUORESCENT/ACID STAI: CPT

## 2018-03-16 RX ORDER — ENOXAPARIN SODIUM 100 MG/ML
70 INJECTION SUBCUTANEOUS
Qty: 0 | Refills: 0 | Status: DISCONTINUED | OUTPATIENT
Start: 2018-03-16 | End: 2018-03-16

## 2018-03-16 RX ORDER — ENOXAPARIN SODIUM 100 MG/ML
80 INJECTION SUBCUTANEOUS
Qty: 4800 | Refills: 0 | OUTPATIENT
Start: 2018-03-16 | End: 2018-04-14

## 2018-03-16 RX ORDER — CAPECITABINE 500 MG/1
3 TABLET ORAL
Qty: 0 | Refills: 0 | COMMUNITY

## 2018-03-16 RX ADMIN — Medication 10 MILLIGRAM(S): at 10:44

## 2018-03-16 RX ADMIN — Medication 100 MILLIGRAM(S): at 05:11

## 2018-03-16 RX ADMIN — Medication 100 MILLIGRAM(S): at 17:01

## 2018-03-16 RX ADMIN — Medication 1: at 08:02

## 2018-03-16 RX ADMIN — POLYETHYLENE GLYCOL 3350 17 GRAM(S): 17 POWDER, FOR SOLUTION ORAL at 05:13

## 2018-03-16 RX ADMIN — GABAPENTIN 300 MILLIGRAM(S): 400 CAPSULE ORAL at 05:11

## 2018-03-16 RX ADMIN — GABAPENTIN 300 MILLIGRAM(S): 400 CAPSULE ORAL at 17:01

## 2018-03-16 RX ADMIN — ENOXAPARIN SODIUM 70 MILLIGRAM(S): 100 INJECTION SUBCUTANEOUS at 18:33

## 2018-03-16 RX ADMIN — Medication 100 MILLIGRAM(S): at 12:19

## 2018-03-16 RX ADMIN — AMLODIPINE BESYLATE 10 MILLIGRAM(S): 2.5 TABLET ORAL at 05:11

## 2018-03-16 RX ADMIN — Medication 1: at 12:19

## 2018-03-16 RX ADMIN — SENNA PLUS 2 TABLET(S): 8.6 TABLET ORAL at 05:13

## 2018-03-16 NOTE — PROGRESS NOTE ADULT - ATTENDING COMMENTS
Mrs. García is clinically stable post PleuRx placement.  She will be discharged soon, and can follow up with me.  Can resume lovenox.
Ms. García has a significant persisten right pleural effusion.  Fluid is exudative and likely secondary to malignant process.  Will plan on placement of PleuRx catheter in the next few days.  AGree with recommendations as outlined above.
I was physically present for the key portions of the evaluation and management (E/M) service provided.  I agree with the above history, physical, and plan which I have reviewed and edited where appropriate.
I was physically present for the key portions of the evaluation and management (E/M) service provided. Pt with improved respiratory status, now off NC, awaiting pleurex tomorrow. Will continue lovenox for AC post pleurex. Patient's son to administer Lovenox. Will need to do pleurex teaching prior to discharge as well.
Pt seen and examined. Pt is s/p thoracentesis of large right pleural effusion. 1.5 L serosanguinous fluid drained. Procedure well tolerated. Pt denies pain, reports improvement in SOB. Check post- procedure CXR. F/up fluid cytology. Restart Lovenox.
I was physically present for the key portions of the evaluation and management (E/M) service provided.  I agree with the above history, physical, and plan which I have reviewed and edited where appropriate.
I was physically present for the key portions of the evaluation and management (E/M) service provided.  Pt s/p thoacentesis; to likely need pleurex placement by pulmonology planned for wednesday. PE treatment with lovenox, patient's son to undergo lovenox teaching.
I was physically present for the key portions of the evaluation and management (E/M) service provided.  I agree with the above history, physical, and plan which I have reviewed and edited where appropriate.

## 2018-03-16 NOTE — PROGRESS NOTE ADULT - PROBLEM SELECTOR PLAN 8
- pt wishes all 3 of her children to make decisions for her if she is unable to  - Son to help with lovenox administration and pleur ex care   - pt wishes for full code
FEN: Carb consistent Diet  DVT ppx: therapeutic Lovenox   Dispo: Full code, home with home PT
FEN: Carb consistent Diet  DVT ppx: holding for thoracocentesis tomorrow   Dispo: Full code, pending further med management, f/u PT recs
FEN: Carb consistent Diet  DVT ppx: therapeutic Lovenox   Dispo: Full code, home with home PT
FEN: Carb consistent Diet  DVT ppx: holding for thoracocentesis tomorrow   Dispo: Full code, pending further med management, f/u PT recs

## 2018-03-16 NOTE — PROGRESS NOTE ADULT - PROBLEM SELECTOR PROBLEM 3
Lung collapse
Other acute pulmonary embolism without acute cor pulmonale
Type 2 diabetes mellitus without complication, without long-term current use of insulin
Lung collapse
Other acute pulmonary embolism without acute cor pulmonale
Other acute pulmonary embolism without acute cor pulmonale
Pulmonary embolism
Pulmonary embolism

## 2018-03-16 NOTE — PROGRESS NOTE ADULT - PROBLEM SELECTOR PROBLEM 4
Acute respiratory failure with hypoxia
Acute respiratory failure with hypoxia
Constipation
Acute respiratory failure with hypoxia

## 2018-03-16 NOTE — PROGRESS NOTE ADULT - SUBJECTIVE AND OBJECTIVE BOX
CHIEF COMPLAINT: SOB    Interval Events: PleurX placed last night. patient doing well. no bleeding. patient walking around. no chest pain. no significant dyspnea. drained 500 cc of fluid again today.    REVIEW OF SYSTEMS:  Constitutional: No fevers or chills. No weight loss. No fatigue or generalised malaise.  Eyes: No itching or discharge from the eyes  ENT: No ear pain. No ear discharge. No nasal congestion. No post nasal drip. No epistaxis. No throat pain. No sore throat. No difficulty swallowing.   CV: No chest pain. No palpitations. No lightheadedness or dizziness.   Resp: No dyspnea at rest. No dyspnea on exertion. No orthopnea. No wheezing. No cough. No stridor. No sputum production. No chest pain with respiration.  GI: No nausea. No vomiting. No diarrhea.  MSK: No joint pain or pain in any extremities  Integumentary: No skin lesions. No pedal edema.  Neurological: No gross motor weakness. No sensory changes.  [s] All other systems negative  [ ] Unable to assess ROS because ________    OBJECTIVE:  ICU Vital Signs Last 24 Hrs  T(C): 36.9 (16 Mar 2018 16:11), Max: 37 (15 Mar 2018 20:44)  T(F): 98.5 (16 Mar 2018 16:11), Max: 98.6 (15 Mar 2018 20:44)  HR: 91 (16 Mar 2018 16:59) (79 - 92)  BP: 165/75 (16 Mar 2018 16:59) (132/70 - 173/81)  RR: 18 (16 Mar 2018 16:11) (16 - 18)  SpO2: 95% (16 Mar 2018 16:11) (92% - 95%)        03-15 @ 07:01  -  03-16 @ 07:00  --------------------------------------------------------  IN: 1680 mL / OUT: 600 mL / NET: 1080 mL    03-16 @ 07:01 - 03-16 @ 18:14  --------------------------------------------------------  IN: 960 mL / OUT: 500 mL / NET: 460 mL      PHYSICAL EXAM:  General: sitting up; pleasant; on RA.   HEENT: clear OP  Lymph Nodes: no signficant cervical LAD.  Respiratory: improved breath sounds on the R base; still decrease at the base.  PleurX catheter and dressing in place.   Cardiovascular: nl rate and reg rhythm. nl s1, s2. no m/r/g.  Abdomen: soft. nt. nd.   Extremities: no edema.   Skin: no rash; surgical scars from the mastectomy.  Neurological: [address] MEDICATIONS:  MEDICATIONS  (STANDING):  amLODIPine   Tablet 10 milliGRAM(s) Oral daily  dextrose 5%. 1000 milliLiter(s) (50 mL/Hr) IV Continuous <Continuous>  dextrose 50% Injectable 12.5 Gram(s) IV Push once  dextrose 50% Injectable 25 Gram(s) IV Push once  dextrose 50% Injectable 25 Gram(s) IV Push once  docusate sodium 100 milliGRAM(s) Oral three times a day  enoxaparin Injectable 70 milliGRAM(s) SubCutaneous two times a day  gabapentin 300 milliGRAM(s) Oral two times a day  insulin lispro (HumaLOG) corrective regimen sliding scale   SubCutaneous at bedtime  insulin lispro (HumaLOG) corrective regimen sliding scale   SubCutaneous three times a day before meals  labetalol 100 milliGRAM(s) Oral two times a day    MEDICATIONS  (PRN):  dextrose Gel 1 Dose(s) Oral once PRN Blood Glucose LESS THAN 70 milliGRAM(s)/deciliter  glucagon  Injectable 1 milliGRAM(s) IntraMuscular once PRN Glucose LESS THAN 70 milligrams/deciliter  oxyCODONE    5 mG/acetaminophen 325 mG 1 Tablet(s) Oral every 6 hours PRN Moderate Pain (4 - 6)  polyethylene glycol 3350 17 Gram(s) Oral two times a day PRN Constipation  senna 2 Tablet(s) Oral at bedtime PRN Constipation      LABS:                        10.8   7.26  )-----------( 323      ( 16 Mar 2018 07:42 )             34.7     03-16    139  |  101  |  17  ----------------------------<  175<H>  4.1   |  26  |  1.25    Ca    9.8      16 Mar 2018 07:04  Phos  3.2     03-16  Mg     1.9     03-16      PT/INR - ( 15 Mar 2018 07:59 )   PT: 11.4 sec;   INR: 1.04 ratio         PTT - ( 15 Mar 2018 07:59 )  PTT:32.0 sec

## 2018-03-16 NOTE — PROGRESS NOTE ADULT - PROBLEM SELECTOR PLAN 1
Large right pleural effusion on the right most likely 2/2 to malignancy  - S/P thora with 1.5 L serosanguinous fluid removed on 03/11 and R pleurex catheter placement 3/15  - Pleural fluid exudative, cytology pending   - May restart following procedure

## 2018-03-16 NOTE — PROGRESS NOTE ADULT - PROBLEM SELECTOR PROBLEM 7
Advanced care planning/counseling discussion
Advanced care planning/counseling discussion
Malignant neoplasm of left breast in female, estrogen receptor negative, unspecified site of breast
Advanced care planning/counseling discussion

## 2018-03-16 NOTE — PROGRESS NOTE ADULT - PROBLEM SELECTOR PROBLEM 1
Lung collapse
Pleural effusion on right
Pleural effusion on right
Lung collapse
Lung collapse
Malignant neoplasm of upper-outer quadrant of left breast in female, estrogen receptor negative
Other acute pulmonary embolism without acute cor pulmonale
Pleural effusion on right
Malignant neoplasm of upper-outer quadrant of left breast in female, estrogen receptor negative

## 2018-03-16 NOTE — PROGRESS NOTE ADULT - PROVIDER SPECIALTY LIST ADULT
Heme/Onc
Internal Medicine
Pulmonology
Internal Medicine

## 2018-03-16 NOTE — PROGRESS NOTE ADULT - PROBLEM SELECTOR PROBLEM 8
Advanced care planning/counseling discussion
Need for prophylactic measure

## 2018-03-16 NOTE — PROGRESS NOTE ADULT - ASSESSMENT
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

## 2018-03-16 NOTE — PROGRESS NOTE ADULT - PROBLEM SELECTOR PROBLEM 2
Other acute pulmonary embolism without acute cor pulmonale
Other acute pulmonary embolism without acute cor pulmonale
Pleural effusion on right
Malignant neoplasm of upper-outer quadrant of left breast in female, estrogen receptor negative
Other acute pulmonary embolism without acute cor pulmonale
Pleural effusion
Pleural effusion on right
Pleural effusion on right
Pleural effusion

## 2018-03-16 NOTE — PROGRESS NOTE ADULT - PROBLEM SELECTOR PLAN 2
- CTA showing subsegmental PE of LLL  - No RHS on CTA or TTE  - hemodynamically stable  - will restart therapeutic Lovenox

## 2018-03-16 NOTE — PROGRESS NOTE ADULT - PROBLEM SELECTOR PROBLEM 6
Acute respiratory failure with hypoxia
Type 2 diabetes mellitus without complication, without long-term current use of insulin

## 2018-03-16 NOTE — PROGRESS NOTE ADULT - SUBJECTIVE AND OBJECTIVE BOX
Patient is a 82y old  Female who presents with a chief complaint of SOB, tiredness (15 Mar 2018 10:18)      SUBJECTIVE / OVERNIGHT EVENTS: s/p pleurex catheter yesterday. Patient is complaining of mild pain at the pleurex site. Has not had a bowel movement in several days.       ROS:  Gen: No fever, chills  Resp: No cough, SOB  CV: No chest pain, palpitations  GI: No nausea, vomiting, abdominal pain, diarrhea, constipation, melena, hematochezia  MSK: No arthralgia, weakness, paresthesia  Skin: No rash  ROS negative unless otherwise noted    MEDICATIONS  (STANDING):  amLODIPine   Tablet 10 milliGRAM(s) Oral daily  dextrose 5%. 1000 milliLiter(s) (50 mL/Hr) IV Continuous <Continuous>  dextrose 50% Injectable 12.5 Gram(s) IV Push once  dextrose 50% Injectable 25 Gram(s) IV Push once  dextrose 50% Injectable 25 Gram(s) IV Push once  docusate sodium 100 milliGRAM(s) Oral three times a day  gabapentin 300 milliGRAM(s) Oral two times a day  influenza   Vaccine 0.5 milliLiter(s) IntraMuscular once  insulin lispro (HumaLOG) corrective regimen sliding scale   SubCutaneous at bedtime  insulin lispro (HumaLOG) corrective regimen sliding scale   SubCutaneous three times a day before meals  labetalol 100 milliGRAM(s) Oral two times a day    MEDICATIONS  (PRN):  dextrose Gel 1 Dose(s) Oral once PRN Blood Glucose LESS THAN 70 milliGRAM(s)/deciliter  glucagon  Injectable 1 milliGRAM(s) IntraMuscular once PRN Glucose LESS THAN 70 milligrams/deciliter  oxyCODONE    5 mG/acetaminophen 325 mG 1 Tablet(s) Oral every 6 hours PRN Moderate Pain (4 - 6)  polyethylene glycol 3350 17 Gram(s) Oral two times a day PRN Constipation  senna 2 Tablet(s) Oral at bedtime PRN Constipation      CAPILLARY BLOOD GLUCOSE  POCT Blood Glucose.: 159 mg/dL (16 Mar 2018 12:15)  POCT Blood Glucose.: 160 mg/dL (16 Mar 2018 07:36)  POCT Blood Glucose.: 225 mg/dL (15 Mar 2018 21:26)  POCT Blood Glucose.: 151 mg/dL (15 Mar 2018 18:18)      PHYSICAL EXAM:  T(C): 37 (03-16-18 @ 11:35), Max: 37 (03-15-18 @ 20:44)  HR: 80 (03-16-18 @ 11:35) (79 - 92)  BP: 152/72 (03-16-18 @ 11:35) (132/70 - 173/81)  RR: 18 (03-16-18 @ 11:35) (16 - 18)  SpO2: 95% (03-16-18 @ 11:35) (92% - 95%)  Wt(kg): --  Daily     Daily   I&O's Summary    15 Mar 2018 07:01  -  16 Mar 2018 07:00  --------------------------------------------------------  IN: 1680 mL / OUT: 600 mL / NET: 1080 mL    16 Mar 2018 07:01  -  16 Mar 2018 13:54  --------------------------------------------------------  IN: 240 mL / OUT: 0 mL / NET: 240 mL        GENERAL: NAD, well-developed  HEAD:  Atraumatic, Normocephalic  EYES: EOMI, PERRLA, conjunctiva and sclera clear  NECK: Supple, No JVD  CHEST/LUNG: Decreased breath sounds at the right base.   - pleurex catheter dressing with very light serosangineous drainage.   HEART: Regular rate and rhythm; No murmurs, rubs, or gallops  ABDOMEN: Soft, Nontender, Nondistended; Bowel sounds present  EXTREMITIES:  2+ Peripheral Pulses, No clubbing, cyanosis, or edema  PSYCH: AAOx3  NEUROLOGY: non-focal  SKIN: No rashes or lesions    LABS:                        10.8   7.26  )-----------( 323      ( 16 Mar 2018 07:42 )             34.7     03-16    139  |  101  |  17  ----------------------------<  175<H>  4.1   |  26  |  1.25    Ca    9.8      16 Mar 2018 07:04  Phos  3.2     03-16  Mg     1.9     03-16      PT/INR - ( 15 Mar 2018 07:59 )   PT: 11.4 sec;   INR: 1.04 ratio    PTT - ( 15 Mar 2018 07:59 )  PTT:32.0 sec            RADIOLOGY & ADDITIONAL TESTS:  Xray Chest 1 View- PORTABLE-Urgent (03.15.18 @ 17:04) >  IMPRESSION:   Right-sided Pleurx catheter with a moderate right-sided pleural effusion.      Imaging Personally Reviewed:  Consultant(s) Notes Reviewed  Care Discussed with Consultants/Other Providers    Deana Morin MD  PGY1 - Internal Medicine  [facility] [phone] /[phone]

## 2018-03-16 NOTE — PROGRESS NOTE ADULT - PROBLEM SELECTOR PLAN 9
FEN: Carb consistent Diet  DVT ppx: therapeutic Lovenox to be restarted.  Dispo: Full code, home with home PT  Barriers to discharge: children require teaching for lovenox an pleurex catheter drainage.

## 2018-03-16 NOTE — PROGRESS NOTE ADULT - PROBLEM SELECTOR PROBLEM 5
Lung collapse
Malignant neoplasm of left breast in female, estrogen receptor negative, unspecified site of breast

## 2018-03-16 NOTE — PROGRESS NOTE ADULT - ASSESSMENT
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  s/p thoracentesis which showed exudative fluid -- positive for metastatic adenoca.    s/p PleurX placement yesterday. doing well from the respiratory standpoint. on RA.     - please have her drain the PleurX 3x/week -- can do 500 cc each time.   - can restart her Lovenox for the PE.   - she is going to follow up with Dr. Jeong as an outpt.   - stable for d/c from the pulmonary perspective.     please call us back for any other questions.   thank you.     Chase Mclain MD   Pulmonary Fellow.

## 2018-03-16 NOTE — PROGRESS NOTE ADULT - PROBLEM SELECTOR PLAN 4
has not had bm in several days  - currently on colace, senna, miralax   - added duculax suppository.

## 2018-03-22 LAB
-  AMPICILLIN/SULBACTAM: SIGNIFICANT CHANGE UP
-  CEFAZOLIN: SIGNIFICANT CHANGE UP
-  CIPROFLOXACIN: SIGNIFICANT CHANGE UP
-  CLINDAMYCIN: SIGNIFICANT CHANGE UP
-  ERYTHROMYCIN: SIGNIFICANT CHANGE UP
-  GENTAMICIN: SIGNIFICANT CHANGE UP
-  LEVOFLOXACIN: SIGNIFICANT CHANGE UP
-  MOXIFLOXACIN(AEROBIC): SIGNIFICANT CHANGE UP
-  OXACILLIN: SIGNIFICANT CHANGE UP
-  RIFAMPIN: SIGNIFICANT CHANGE UP
-  TETRACYCLINE: SIGNIFICANT CHANGE UP
-  VANCOMYCIN: SIGNIFICANT CHANGE UP
CULTURE RESULTS: SIGNIFICANT CHANGE UP
METHOD TYPE: SIGNIFICANT CHANGE UP
ORGANISM # SPEC MICROSCOPIC CNT: SIGNIFICANT CHANGE UP
ORGANISM # SPEC MICROSCOPIC CNT: SIGNIFICANT CHANGE UP
SPECIMEN SOURCE: SIGNIFICANT CHANGE UP

## 2018-03-27 ENCOUNTER — APPOINTMENT (OUTPATIENT)
Dept: PULMONOLOGY | Facility: CLINIC | Age: 83
End: 2018-03-27

## 2018-03-27 VITALS
WEIGHT: 151 LBS | OXYGEN SATURATION: 95 % | HEART RATE: 83 BPM | BODY MASS INDEX: 26.75 KG/M2 | HEIGHT: 63 IN | TEMPERATURE: 98.6 F | SYSTOLIC BLOOD PRESSURE: 140 MMHG | DIASTOLIC BLOOD PRESSURE: 80 MMHG

## 2018-03-29 ENCOUNTER — APPOINTMENT (OUTPATIENT)
Dept: PULMONOLOGY | Facility: CLINIC | Age: 83
End: 2018-03-29
Payer: MEDICARE

## 2018-03-29 VITALS
SYSTOLIC BLOOD PRESSURE: 140 MMHG | TEMPERATURE: 97.3 F | HEART RATE: 88 BPM | OXYGEN SATURATION: 97 % | BODY MASS INDEX: 26.75 KG/M2 | RESPIRATION RATE: 16 BRPM | HEIGHT: 63 IN | WEIGHT: 151 LBS | DIASTOLIC BLOOD PRESSURE: 80 MMHG

## 2018-03-29 PROCEDURE — 99214 OFFICE O/P EST MOD 30 MIN: CPT

## 2018-04-03 ENCOUNTER — RESULT REVIEW (OUTPATIENT)
Age: 83
End: 2018-04-03

## 2018-04-03 ENCOUNTER — APPOINTMENT (OUTPATIENT)
Dept: HEMATOLOGY ONCOLOGY | Facility: CLINIC | Age: 83
End: 2018-04-03
Payer: MEDICARE

## 2018-04-03 VITALS
BODY MASS INDEX: 26.91 KG/M2 | WEIGHT: 151.9 LBS | SYSTOLIC BLOOD PRESSURE: 164 MMHG | TEMPERATURE: 98.5 F | DIASTOLIC BLOOD PRESSURE: 78 MMHG | HEART RATE: 77 BPM | RESPIRATION RATE: 16 BRPM | OXYGEN SATURATION: 97 %

## 2018-04-03 DIAGNOSIS — R22.32 LOCALIZED SWELLING, MASS AND LUMP, LEFT UPPER LIMB: ICD-10-CM

## 2018-04-03 DIAGNOSIS — C50.919 MALIGNANT NEOPLASM OF UNSPECIFIED SITE OF UNSPECIFIED FEMALE BREAST: ICD-10-CM

## 2018-04-03 DIAGNOSIS — R06.02 SHORTNESS OF BREATH: ICD-10-CM

## 2018-04-03 LAB
ALBUMIN SERPL ELPH-MCNC: 3.3 G/DL
ALP BLD-CCNC: 529 U/L
ALT SERPL-CCNC: 66 U/L
ANION GAP SERPL CALC-SCNC: 11 MMOL/L
AST SERPL-CCNC: 97 U/L
BILIRUB SERPL-MCNC: 0.8 MG/DL
BUN SERPL-MCNC: 12 MG/DL
CALCIUM SERPL-MCNC: 9.4 MG/DL
CEA SERPL-MCNC: 12.7 NG/ML
CHLORIDE SERPL-SCNC: 104 MMOL/L
CO2 SERPL-SCNC: 24 MMOL/L
CREAT SERPL-MCNC: 1.18 MG/DL
GLUCOSE SERPL-MCNC: 119 MG/DL
HCT VFR BLD CALC: 32.9 % — LOW (ref 34.5–45)
HGB BLD-MCNC: 10.8 G/DL — LOW (ref 11.5–15.5)
MCHC RBC-ENTMCNC: 31.3 PG — SIGNIFICANT CHANGE UP (ref 27–34)
MCHC RBC-ENTMCNC: 32.7 G/DL — SIGNIFICANT CHANGE UP (ref 32–36)
MCV RBC AUTO: 95.8 FL — SIGNIFICANT CHANGE UP (ref 80–100)
PLATELET # BLD AUTO: 321 K/UL — SIGNIFICANT CHANGE UP (ref 150–400)
POTASSIUM SERPL-SCNC: 4.5 MMOL/L
PROT SERPL-MCNC: 6.1 G/DL
RBC # BLD: 3.44 M/UL — LOW (ref 3.8–5.2)
RBC # FLD: 15.6 % — HIGH (ref 10.3–14.5)
SODIUM SERPL-SCNC: 139 MMOL/L
WBC # BLD: 5 K/UL — SIGNIFICANT CHANGE UP (ref 3.8–10.5)
WBC # FLD AUTO: 5 K/UL — SIGNIFICANT CHANGE UP (ref 3.8–10.5)

## 2018-04-03 PROCEDURE — 99215 OFFICE O/P EST HI 40 MIN: CPT

## 2018-04-03 RX ORDER — ENOXAPARIN SODIUM 80 MG/.8ML
80 INJECTION SUBCUTANEOUS
Qty: 0.8 | Refills: 0 | Status: ACTIVE | COMMUNITY
Start: 2018-04-03

## 2018-04-04 LAB — CANCER AG27-29 SERPL-ACNC: 24.2 U/ML

## 2018-04-10 ENCOUNTER — APPOINTMENT (OUTPATIENT)
Dept: CARDIOLOGY | Facility: CLINIC | Age: 83
End: 2018-04-10

## 2018-04-11 LAB
CULTURE RESULTS: SIGNIFICANT CHANGE UP
SPECIMEN SOURCE: SIGNIFICANT CHANGE UP

## 2018-05-01 ENCOUNTER — OUTPATIENT (OUTPATIENT)
Dept: OUTPATIENT SERVICES | Facility: HOSPITAL | Age: 83
LOS: 1 days | End: 2018-05-01
Payer: COMMERCIAL

## 2018-05-01 ENCOUNTER — APPOINTMENT (OUTPATIENT)
Dept: PULMONOLOGY | Facility: CLINIC | Age: 83
End: 2018-05-01
Payer: MEDICARE

## 2018-05-01 ENCOUNTER — APPOINTMENT (OUTPATIENT)
Dept: RADIOLOGY | Facility: IMAGING CENTER | Age: 83
End: 2018-05-01
Payer: MEDICARE

## 2018-05-01 VITALS
OXYGEN SATURATION: 92 % | WEIGHT: 148 LBS | HEART RATE: 110 BPM | RESPIRATION RATE: 16 BRPM | HEIGHT: 63 IN | DIASTOLIC BLOOD PRESSURE: 70 MMHG | BODY MASS INDEX: 26.22 KG/M2 | SYSTOLIC BLOOD PRESSURE: 110 MMHG | TEMPERATURE: 96 F

## 2018-05-01 DIAGNOSIS — Z90.12 ACQUIRED ABSENCE OF LEFT BREAST AND NIPPLE: Chronic | ICD-10-CM

## 2018-05-01 DIAGNOSIS — J90 PLEURAL EFFUSION, NOT ELSEWHERE CLASSIFIED: ICD-10-CM

## 2018-05-01 PROCEDURE — 71046 X-RAY EXAM CHEST 2 VIEWS: CPT | Mod: 26

## 2018-05-01 PROCEDURE — 71046 X-RAY EXAM CHEST 2 VIEWS: CPT

## 2018-05-01 PROCEDURE — 99214 OFFICE O/P EST MOD 30 MIN: CPT | Mod: GV

## 2018-05-02 LAB
CULTURE RESULTS: SIGNIFICANT CHANGE UP
SPECIMEN SOURCE: SIGNIFICANT CHANGE UP

## 2018-05-11 ENCOUNTER — APPOINTMENT (OUTPATIENT)
Dept: PULMONOLOGY | Facility: HOSPITAL | Age: 83
End: 2018-05-11

## 2018-06-05 ENCOUNTER — APPOINTMENT (OUTPATIENT)
Dept: PULMONOLOGY | Facility: CLINIC | Age: 83
End: 2018-06-05

## 2020-09-27 NOTE — PROGRESS NOTE ADULT - PROBLEM SELECTOR PLAN 1
Large right pleural effusion on the right most likely 2/2 to malignancy  - S/P thora with 1.5 L serosanguinous fluid removed on 03/11  - Pleural fluid exudative, cytology pending   - Patient receiving Pleur ex today   - Hold Lovenox prior to CT placement, restart following procedure 13.7   6.11  )-----------( 233      ( 27 Sep 2020 00:31 )             41.1           134<L>  |  99  |  11  ----------------------------<  120<H>  3.9   |  23  |  0.58    Ca    9.3      27 Sep 2020 00:31    TPro  7.1  /  Alb  3.4  /  TBili  0.3  /  DBili  x   /  AST  55<H>  /  ALT  26  /  AlkPhos  82            Urinalysis Basic - ( 27 Sep 2020 01:19 )    Color: Yellow / Appearance: Clear / S.010 / pH: x  Gluc: x / Ketone: Trace mg/dL  / Bili: Negative / Urobili: 0.2 E.U./dL   Blood: x / Protein: NEGATIVE mg/dL / Nitrite: NEGATIVE   Leuk Esterase: NEGATIVE / RBC: x / WBC x   Sq Epi: x / Non Sq Epi: x / Bacteria: x      Lactate Trend   @ 00:31 Lactate:1.5       CARDIAC MARKERS ( 27 Sep 2020 00:31 )  x     / <0.01 ng/mL / x     / x     / x            CAPILLARY BLOOD GLUCOSE

## 2022-05-09 NOTE — ED ADULT TRIAGE NOTE - CHIEF COMPLAINT QUOTE
[sob sent by oncologist

## 2022-10-06 NOTE — PATIENT PROFILE ADULT. - TEACHING/LEARNING FACTORS IMPACT ABILITY TO LEARN
none Isotretinoin Pregnancy And Lactation Text: This medication is Pregnancy Category X and is considered extremely dangerous during pregnancy. It is unknown if it is excreted in breast milk.

## 2023-01-03 NOTE — ASU PATIENT PROFILE, ADULT - PSH
A&O x4. VSS. RA. Denies N/V/D. Up with supervision.   Reported right hip pain - scheduled Norco and Lidocaine patches are providing adequate relief.     Taken for US soft tissue biopsy.     Frequently monitored and assessed. No acute changes.     Discharge order received. R PIV removed. Discharge education, medication, and follow-up instructions reviewed with patient and patient's daughter at bedside. All questions answered. Patient verbalized understanding. Discharged to home. Left unit in stable condition.    H/O mastectomy, left  2015  History of  section    History of hysterectomy

## 2025-02-27 NOTE — CONSULT NOTE ADULT - PROBLEM SELECTOR RECOMMENDATION 9
No signs of active infection.   Likely etiology of shortness of breath.   Pulmonology following, appreciate recommendations.   Plan is for therapeutic thoracentesis tomorrow.   Please send fluid for cultures, cytology and cytopathology.   Supplemental O2 as needed as per primary team. complains of pain/discomfort